# Patient Record
Sex: FEMALE | Race: WHITE | NOT HISPANIC OR LATINO | Employment: UNEMPLOYED | URBAN - METROPOLITAN AREA
[De-identification: names, ages, dates, MRNs, and addresses within clinical notes are randomized per-mention and may not be internally consistent; named-entity substitution may affect disease eponyms.]

---

## 2020-05-20 ENCOUNTER — TRANSCRIBE ORDERS (OUTPATIENT)
Dept: ADMINISTRATIVE | Facility: HOSPITAL | Age: 55
End: 2020-05-20

## 2020-05-20 DIAGNOSIS — T14.8XXA SPRAIN: Primary | ICD-10-CM

## 2020-05-31 ENCOUNTER — HOSPITAL ENCOUNTER (OUTPATIENT)
Dept: MRI IMAGING | Facility: HOSPITAL | Age: 55
Discharge: HOME | End: 2020-05-31
Payer: COMMERCIAL

## 2020-05-31 DIAGNOSIS — T14.8XXA SPRAIN: ICD-10-CM

## 2020-05-31 PROCEDURE — 73721 MRI JNT OF LWR EXTRE W/O DYE: CPT

## 2023-02-20 ENCOUNTER — CONSULT (OUTPATIENT)
Dept: NEUROSURGERY | Facility: CLINIC | Age: 58
End: 2023-02-20

## 2023-02-20 VITALS
HEART RATE: 117 BPM | HEIGHT: 66 IN | WEIGHT: 174 LBS | DIASTOLIC BLOOD PRESSURE: 86 MMHG | BODY MASS INDEX: 27.97 KG/M2 | TEMPERATURE: 98 F | SYSTOLIC BLOOD PRESSURE: 128 MMHG | RESPIRATION RATE: 16 BRPM | OXYGEN SATURATION: 98 %

## 2023-02-20 DIAGNOSIS — M54.9 BACK PAIN, UNSPECIFIED BACK LOCATION, UNSPECIFIED BACK PAIN LATERALITY, UNSPECIFIED CHRONICITY: Primary | ICD-10-CM

## 2023-02-20 PROBLEM — M50.90 CERVICAL DISC DISEASE: Status: ACTIVE | Noted: 2023-02-20

## 2023-02-20 PROBLEM — M54.16 LUMBAR RADICULOPATHY: Status: ACTIVE | Noted: 2023-02-20

## 2023-02-20 RX ORDER — PANTOPRAZOLE SODIUM 40 MG/1
TABLET, DELAYED RELEASE ORAL
COMMUNITY
Start: 2022-11-29

## 2023-02-20 RX ORDER — METAXALONE 800 MG/1
TABLET ORAL
COMMUNITY
Start: 2022-12-29

## 2023-02-20 RX ORDER — LIDOCAINE 50 MG/G
PATCH TOPICAL
COMMUNITY
Start: 2022-11-27

## 2023-02-20 RX ORDER — ACETAMINOPHEN AND CODEINE PHOSPHATE 300; 30 MG/1; MG/1
TABLET ORAL
COMMUNITY
Start: 2022-12-29

## 2023-02-20 RX ORDER — PRAVASTATIN SODIUM 10 MG
TABLET ORAL
COMMUNITY
Start: 2023-01-23

## 2023-02-20 RX ORDER — MONTELUKAST SODIUM 10 MG/1
TABLET ORAL
COMMUNITY
Start: 2023-01-23

## 2023-02-20 RX ORDER — MOMETASONE FUROATE AND FORMOTEROL FUMARATE DIHYDRATE 200; 5 UG/1; UG/1
AEROSOL RESPIRATORY (INHALATION)
COMMUNITY
Start: 2022-12-30

## 2023-02-20 RX ORDER — ALBUTEROL SULFATE 2.5 MG/3ML
SOLUTION RESPIRATORY (INHALATION)
COMMUNITY
Start: 2017-01-20

## 2023-02-20 RX ORDER — LEVOCETIRIZINE DIHYDROCHLORIDE 5 MG/1
TABLET, FILM COATED ORAL
COMMUNITY

## 2023-02-20 RX ORDER — ALBUTEROL SULFATE 2.5 MG/3ML
SOLUTION RESPIRATORY (INHALATION)
COMMUNITY
Start: 2022-11-28

## 2023-02-20 RX ORDER — MELOXICAM 15 MG/1
TABLET ORAL
COMMUNITY
Start: 2022-11-27

## 2023-02-20 RX ORDER — PANCRELIPASE LIPASE, PANCRELIPASE AMYLASE, AND PANCRELIPASE PROTEASE 149900; 37000; 97300 [USP'U]/1; [USP'U]/1; [USP'U]/1
CAPSULE, DELAYED RELEASE ORAL
COMMUNITY
Start: 2022-11-29

## 2023-02-20 RX ORDER — PROGESTERONE 200 MG/1
CAPSULE ORAL
COMMUNITY
Start: 2022-12-29

## 2023-02-20 RX ORDER — EZETIMIBE 10 MG/1
TABLET ORAL
COMMUNITY
Start: 2021-12-19

## 2023-02-20 RX ORDER — TRAMADOL HYDROCHLORIDE 50 MG/1
TABLET ORAL
COMMUNITY
Start: 2022-11-28

## 2023-02-20 RX ORDER — TELMISARTAN AND HYDROCHLORTHIAZIDE 80; 25 MG/1; MG/1
TABLET ORAL
COMMUNITY
Start: 2022-12-17

## 2023-02-20 RX ORDER — ESTRADIOL 1 MG/1
TABLET ORAL
COMMUNITY
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
## 2023-02-20 NOTE — ASSESSMENT & PLAN NOTE
Presents for evaluation of lumbar radiculopathy  · Describes significant bilateral posterior leg pain to ankle coming from back x 1 month  · PCP ordered MRI of lumbar spine however insurance would not approve this  · C/o bilateral LE weakness and pain  · Completed course of steroids per PCP with improvement in symptoms  · On exam, with 4/5 iliopsoas weakness to left  Otherwise intact  No BBI  Plan:  · Recommend MRI of lumbar spine be completed as she is experiencing focal weakness that is new  · Additionally, concern for symptoms of neurogenic claudication vs bilateral S1 radiculopathy  · Will likely require trial of conservative management pending MRI results  · Follow up once imaging has been completed
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
## 2023-02-26 ENCOUNTER — HOSPITAL ENCOUNTER (OUTPATIENT)
Dept: MRI IMAGING | Facility: HOSPITAL | Age: 58
Discharge: HOME | End: 2023-02-26

## 2023-02-26 DIAGNOSIS — M48.062 SPINAL STENOSIS, LUMBAR REGION WITH NEUROGENIC CLAUDICATION: ICD-10-CM

## 2023-11-10 ENCOUNTER — OFFICE VISIT (OUTPATIENT)
Dept: GASTROENTEROLOGY | Facility: AMBULARY SURGERY CENTER | Age: 58
End: 2023-11-10
Payer: COMMERCIAL

## 2023-11-10 VITALS
HEART RATE: 101 BPM | OXYGEN SATURATION: 98 % | BODY MASS INDEX: 29.47 KG/M2 | WEIGHT: 183.4 LBS | SYSTOLIC BLOOD PRESSURE: 130 MMHG | HEIGHT: 66 IN | DIASTOLIC BLOOD PRESSURE: 82 MMHG

## 2023-11-10 DIAGNOSIS — Z90.49 HISTORY OF CHOLECYSTECTOMY: ICD-10-CM

## 2023-11-10 DIAGNOSIS — R10.13 DYSPEPSIA: ICD-10-CM

## 2023-11-10 DIAGNOSIS — K86.81 EXOCRINE PANCREATIC INSUFFICIENCY: Primary | ICD-10-CM

## 2023-11-10 DIAGNOSIS — R13.10 DYSPHAGIA, UNSPECIFIED TYPE: ICD-10-CM

## 2023-11-10 PROCEDURE — 99204 OFFICE O/P NEW MOD 45 MIN: CPT | Performed by: INTERNAL MEDICINE

## 2023-11-10 RX ORDER — SUCRALFATE ORAL 1 G/10ML
1 SUSPENSION ORAL 2 TIMES DAILY
Qty: 600 ML | Refills: 0 | Status: SHIPPED | OUTPATIENT
Start: 2023-11-10 | End: 2023-12-10

## 2023-11-10 RX ORDER — BUDESONIDE 0.5 MG/2ML
INHALANT ORAL
COMMUNITY
Start: 2023-08-25

## 2023-11-10 NOTE — PROGRESS NOTES
Consultation - [address]  [facility]  Merritt Funes 62 y.o. female MRN: [id number]  Unit/Bed#:  Encounter: [id number]        Consults    ASSESSMENT/PLAN:     1.  EPI: Unclear etiology, pancreas appeared grossly unremarkable based on endoscopic ultrasound report from [facility] performed in August 2022. Denies any history of pancreatitis. Reports pancreatic cancer in her grandmother. She is not a smoker. No unintentional weight loss. She was recommended repeat MRI in 1 year interval.  -Currently on  pancreatic enzyme. -Recommend checking stool elastase.  -Recommend MRI/MRCP in summer to assess pancreatic parenchyma.  -Check celiac serologies however patient tells me that this was checked in the past.  -Patient is status post cholecystectomy, suspect diarrhea may partly be due to bile acids as well however patient is hesitant about starting on a bile acid binder, reports that she was previously on Questran which resulted in constipation and worsened her abdominal pain. She does not want to trial bile acid binders at this time. 2.  Dysphagia:  -EGD performed last year was notable for gastritis with biopsies negative for H. pylori. She is currently on Protonix 40 mg on daily basis.  -Underwent barium swallow which was notable for anterior cervical esophageal web and prominent cricopharyngeal bar along with small sliding hiatal hernia.  -We will plan for EGD with possible dilation given patient's reports of dysphagia with pills etc.  -We will continue pantoprazole 40 mg twice daily as patient reports that she has previously failed omeprazole and reports that Nexium was not covered. 3.  GERD:  -Symptoms controlled with pantoprazole 40 mg twice daily. 3.  Colon cancer screening: Underwent colonoscopy in June 2022, unclear what the recommended interval for next colonoscopy was, there was fecal material in right-sided the colon based on the colonoscopy report.   Patient reports having had colonoscopies in the past but does not recall having had polyps. At this time we will hold off on repeating procedure and will obtain the records from  GI.    ______________________________________________________________________    Reason for Consult / Principal Problem: [unfilled]    HPI: Tomas Springer is a 62y.o. year old female with history of EPI, GERD, presents to evaluation of dysphagia symptoms. Patient reports that this is new. She reports that she has had the symptoms for almost 6 months. She reports she is having difficulty swallowing tablets. She reports that they feel as though they are getting stuck. She denies any hematemesis, coffee emesis or unintentional weight loss. Patient also reports chronic symptoms of diarrhea, reports that this has been going on for several years, reports that has been worked extensively at [employer] and was diagnosed with exocrine pancreatic insufficiency. Denies any history of pancreatitis. She does report a family history of pancreatic cancer in her grandmother. She also reports that she has had multiple abdominal surgeries and this has resulted in adhesions. She reports that she was prescribed Questran in the past with concern for bile acid diarrhea however this resulted in decreased bowel movement which worsened her abdominal pain. Patient is hesitant about starting a bile acid binder at this time. She reports that she has been on pantoprazole 40 mg twice daily for some time now, reports that omeprazole has been ineffective. She reports that she  had been on Nexium previously however this was no longer covered with her insurance. Patient has previously been seen at  GI and has undergone endoscopic ultrasound in August 2022. She had also undergone an EGD at the time which was notable for antral and body erythema. Squamocolumnar junction was within normal range. EUS was grossly unremarkable of the pancreas, no biliary or pancreatic disease was seen. She was recommended to continue the pancreatic enzyme replacement treatment. He was also recommended an MRI at 1 year interval.  Biopsies from the EGD were negative for H. pylori infection. Patient underwent colonoscopy in June 2022 which was normal exam with stool seen in the cecum, biopsies were also obtained to assess for microscopic colitis. Pathology was negative for microscopic colitis. It is unclear what the recommendations for follow-up for. Review of Systems: The remainder of the review of systems was negative except for the pertinent positives noted in HPI.      Historical Information   Past Medical History:   Diagnosis Date    Fatty liver 2022    GERD (gastroesophageal reflux disease) 2001    Hyperlipidemia 2005    Hypertension 2005    Lactose intolerance 1999     Past Surgical History:   Procedure Laterality Date    ABDOMINAL SURGERY  2000, 2001, 2002    APPENDECTOMY  2001    CHOLECYSTECTOMY  2002    COLONOSCOPY  2002, 2022    ENDOSCOPIC ULTRASOUND (UPPER)  2022    HYSTERECTOMY  2000    UPPER GASTROINTESTINAL ENDOSCOPY  2002, 2022     Social History   Social History     Substance and Sexual Activity   Alcohol Use Never     Social History     Substance and Sexual Activity   Drug Use Never     Social History     Tobacco Use   Smoking Status Never   Smokeless Tobacco Never     Family History   Problem Relation Age of Onset    Arthritis Mother     Hyperlipidemia Mother     Hypertension Mother     Stroke Mother     Diabetes Father     Heart disease Father         CHF    Hyperlipidemia Father     Hypertension Father     Kidney disease Father         Kidney Failure    Cancer Maternal Grandmother         Pancreatic cancer    Asthma Paternal Grandmother     Breast cancer Maternal Aunt     Breast cancer Cousin     Celiac disease Maternal Aunt     Hyperlipidemia Sister     Hypertension Sister        Meds/Allergies     (Not in a hospital admission)    No current facility-administered medications for this visit. Allergies   Allergen Reactions    Penicillin G Anaphylaxis    Ciprofloxacin Hives    Clarithromycin GI Intolerance and Hives    Monascus Purpureus Went Yeast Nausea Only    Oxycodone-Acetaminophen GI Intolerance and Vomiting    Rosuvastatin Myalgia    Sulfamethoxazole-Trimethoprim Hives       Objective     Blood pressure 130/82, pulse 101, height 5' 6" (1.676 m), weight 83.2 kg (183 lb 6.4 oz), SpO2 98 %. [unfilled]    PHYSICAL EXAM     GEN: well nourished, well developed, no acute distress  HEENT: anicteric, MMM, no cervical or supraclavicular lymphadenopathy  CV: RRR, no m/r/g  CHEST: CTA b/l, no WRR  ABD: +BS, soft, NT/ND, no hepatosplenomegaly  EXT: no c/c/e  SKIN: no rashes,  NEURO: aaox3    Lab Results:   No visits with results within 1 Day(s) from this visit. Latest known visit with results is:   No results found for any previous visit. Imaging Studies: I have personally reviewed pertinent films in PACS                Answers submitted by the patient for this visit:  Abdominal Pain Questionnaire (Submitted on 11/5/2023)  Chief Complaint: Abdominal pain  Chronicity: new  Onset: more than 1 month ago  Onset quality: sudden  Frequency: every several days  Progression since onset: gradually worsening  Pain location: epigastric region  Pain - numeric: 8/10  Pain quality: cramping, sharp  Radiates to: does not radiate  anorexia: No  arthralgias: No  belching: No  constipation: No  diarrhea: No  dysuria: No  fever: No  flatus: No  frequency: No  headaches: No  hematochezia: No  hematuria: No  melena: No  myalgias: No  nausea:  No  weight loss: No  vomiting: No  Aggravated by: nothing  Relieved by: bowel movements  Diagnostic workup: GI consult, lower endoscopy, upper endoscopy

## 2023-11-10 NOTE — PATIENT INSTRUCTIONS
Scheduled date of EGD(as of today):  12/20/23  Physician performing EGD:  Dr Quang Green   Location of EGD: [facility]   Instructions reviewed with patient by: annmarie MENON   Clearances: N/a

## 2023-11-24 LAB
25(OH)D3 SERPL-MCNC: 74 NG/ML (ref 30–100)
A-TOCOPHEROL VIT E SERPL-MCNC: 17.5 MG/L (ref 5.7–19.9)
BETA+GAMMA TOCOPHEROL SERPL-MCNC: <1 MG/L
IGA SERPL-MCNC: 310 MG/DL (ref 47–310)
PHYTONADIONE SERPL-MCNC: 299 PG/ML (ref 130–1500)
TSH SERPL-ACNC: 2.5 MIU/L (ref 0.4–4.5)
TTG IGA SER-ACNC: <1 U/ML
VIT A SERPL-MCNC: 74 MCG/DL (ref 38–98)

## 2023-12-05 ENCOUNTER — ANESTHESIA (OUTPATIENT)
Dept: ANESTHESIOLOGY | Facility: HOSPITAL | Age: 58
End: 2023-12-05

## 2023-12-05 ENCOUNTER — ANESTHESIA EVENT (OUTPATIENT)
Dept: ANESTHESIOLOGY | Facility: HOSPITAL | Age: 58
End: 2023-12-05

## 2023-12-19 RX ORDER — SODIUM CHLORIDE, SODIUM LACTATE, POTASSIUM CHLORIDE, CALCIUM CHLORIDE 600; 310; 30; 20 MG/100ML; MG/100ML; MG/100ML; MG/100ML
75 INJECTION, SOLUTION INTRAVENOUS CONTINUOUS
Status: CANCELLED | OUTPATIENT
Start: 2023-12-19

## 2023-12-20 ENCOUNTER — HOSPITAL ENCOUNTER (OUTPATIENT)
Dept: GASTROENTEROLOGY | Facility: AMBULARY SURGERY CENTER | Age: 58
Setting detail: OUTPATIENT SURGERY
Discharge: HOME | End: 2023-12-20
Attending: INTERNAL MEDICINE
Payer: COMMERCIAL

## 2023-12-20 ENCOUNTER — ANESTHESIA (OUTPATIENT)
Dept: GASTROENTEROLOGY | Facility: AMBULARY SURGERY CENTER | Age: 58
End: 2023-12-20

## 2023-12-20 ENCOUNTER — ANESTHESIA EVENT (OUTPATIENT)
Dept: GASTROENTEROLOGY | Facility: AMBULARY SURGERY CENTER | Age: 58
End: 2023-12-20

## 2023-12-20 VITALS
SYSTOLIC BLOOD PRESSURE: 132 MMHG | TEMPERATURE: 97.9 F | HEART RATE: 93 BPM | RESPIRATION RATE: 18 BRPM | DIASTOLIC BLOOD PRESSURE: 67 MMHG | OXYGEN SATURATION: 99 %

## 2023-12-20 DIAGNOSIS — R10.13 DYSPEPSIA: ICD-10-CM

## 2023-12-20 PROBLEM — E78.5 HYPERLIPIDEMIA: Status: ACTIVE | Noted: 2023-12-20

## 2023-12-20 PROBLEM — I74.9 ARTERIAL THROMBOSIS (HCC): Status: ACTIVE | Noted: 2023-12-20

## 2023-12-20 PROBLEM — J45.909 MODERATE ASTHMA: Status: ACTIVE | Noted: 2023-12-20

## 2023-12-20 PROBLEM — I10 HTN (HYPERTENSION): Status: ACTIVE | Noted: 2023-12-20

## 2023-12-20 PROBLEM — D64.9 ANEMIA: Status: ACTIVE | Noted: 2023-12-20

## 2023-12-20 PROBLEM — K21.9 GASTROESOPHAGEAL REFLUX DISEASE: Status: ACTIVE | Noted: 2023-12-20

## 2023-12-20 PROCEDURE — 88305 TISSUE EXAM BY PATHOLOGIST: CPT | Performed by: PATHOLOGY

## 2023-12-20 PROCEDURE — 43248 EGD GUIDE WIRE INSERTION: CPT | Performed by: INTERNAL MEDICINE

## 2023-12-20 PROCEDURE — 43239 EGD BIOPSY SINGLE/MULTIPLE: CPT | Performed by: INTERNAL MEDICINE

## 2023-12-20 PROCEDURE — 43251 EGD REMOVE LESION SNARE: CPT | Performed by: INTERNAL MEDICINE

## 2023-12-20 PROCEDURE — C1769 GUIDE WIRE: HCPCS

## 2023-12-20 RX ORDER — PROPOFOL 10 MG/ML
INJECTION, EMULSION INTRAVENOUS AS NEEDED
Status: DISCONTINUED | OUTPATIENT
Start: 2023-12-20 | End: 2023-12-20

## 2023-12-20 RX ORDER — SODIUM CHLORIDE, SODIUM LACTATE, POTASSIUM CHLORIDE, CALCIUM CHLORIDE 600; 310; 30; 20 MG/100ML; MG/100ML; MG/100ML; MG/100ML
75 INJECTION, SOLUTION INTRAVENOUS CONTINUOUS
Status: DISCONTINUED | OUTPATIENT
Start: 2023-12-20 | End: 2023-12-24 | Stop reason: HOSPADM

## 2023-12-20 RX ORDER — LIDOCAINE HYDROCHLORIDE 10 MG/ML
INJECTION, SOLUTION EPIDURAL; INFILTRATION; INTRACAUDAL; PERINEURAL AS NEEDED
Status: DISCONTINUED | OUTPATIENT
Start: 2023-12-20 | End: 2023-12-20

## 2023-12-20 RX ORDER — PANCRELIPASE LIPASE, PANCRELIPASE PROTEASE, PANCRELIPASE AMYLASE 40000; 126000; 168000 [USP'U]/1; [USP'U]/1; [USP'U]/1
40000 CAPSULE, DELAYED RELEASE ORAL DAILY
COMMUNITY

## 2023-12-20 RX ORDER — ALBUTEROL SULFATE 2.5 MG/3ML
2.5 SOLUTION RESPIRATORY (INHALATION) ONCE
Status: COMPLETED | OUTPATIENT
Start: 2023-12-20 | End: 2023-12-20

## 2023-12-20 RX ADMIN — ALBUTEROL SULFATE 2.5 MG: 2.5 SOLUTION RESPIRATORY (INHALATION) at 10:30

## 2023-12-20 RX ADMIN — LIDOCAINE HYDROCHLORIDE 50 MG: 10 INJECTION, SOLUTION EPIDURAL; INFILTRATION; INTRACAUDAL; PERINEURAL at 11:12

## 2023-12-20 RX ADMIN — PROPOFOL 50 MG: 10 INJECTION, EMULSION INTRAVENOUS at 11:14

## 2023-12-20 RX ADMIN — SODIUM CHLORIDE, SODIUM LACTATE, POTASSIUM CHLORIDE, AND CALCIUM CHLORIDE 75 ML/HR: .6; .31; .03; .02 INJECTION, SOLUTION INTRAVENOUS at 10:03

## 2023-12-20 RX ADMIN — PROPOFOL 200 MG: 10 INJECTION, EMULSION INTRAVENOUS at 11:12

## 2023-12-20 RX ADMIN — PROPOFOL 50 MG: 10 INJECTION, EMULSION INTRAVENOUS at 11:18

## 2023-12-20 RX ADMIN — PROPOFOL 50 MG: 10 INJECTION, EMULSION INTRAVENOUS at 11:13

## 2023-12-20 NOTE — H&P
History and Physical -  [facility]  Dayana Boyd 58 y.o. female MRN: [id number]    HPI: Dayana Boyd is a 58 y.o. year old female who presents for dysphagia.       Review of Systems    Historical Information   Past Medical History:   Diagnosis Date    Fatty liver 2022    GERD (gastroesophageal reflux disease) 2001    Hyperlipidemia 2005    Hypertension 2005    Lactose intolerance 1999     Past Surgical History:   Procedure Laterality Date    ABDOMINAL SURGERY  2000, 2001, 2002    APPENDECTOMY  2001    CHOLECYSTECTOMY  2002    COLONOSCOPY  2002, 2022    ENDOSCOPIC ULTRASOUND (UPPER)  2022    HYSTERECTOMY  2000    UPPER GASTROINTESTINAL ENDOSCOPY  2002, 2022     Social History   Social History     Substance and Sexual Activity   Alcohol Use Never     Social History     Substance and Sexual Activity   Drug Use Never     Social History     Tobacco Use   Smoking Status Never   Smokeless Tobacco Never     Family History   Problem Relation Age of Onset    Arthritis Mother     Hyperlipidemia Mother     Hypertension Mother     Stroke Mother     Diabetes Father     Heart disease Father         CHF    Hyperlipidemia Father     Hypertension Father     Kidney disease Father         Kidney Failure    Cancer Maternal Grandmother         Pancreatic cancer    Asthma Paternal Grandmother     Breast cancer Maternal Aunt     Breast cancer Cousin     Celiac disease Maternal Aunt     Hyperlipidemia Sister     Hypertension Sister        Meds/Allergies     (Not in a hospital admission)      Allergies   Allergen Reactions    Penicillin G Anaphylaxis    Ciprofloxacin Hives    Clarithromycin GI Intolerance and Hives    Monascus Purpureus Went Yeast Nausea Only    Oxycodone-Acetaminophen GI Intolerance and Vomiting    Rosuvastatin Myalgia    Sulfamethoxazole-Trimethoprim Hives       Objective     /79   Pulse 104   Temp 97.9 °F (36.6 °C) (Temporal)   Resp 18   SpO2 99%       PHYSICAL EXAM    Gen: NAD  CV:  RRR  CHEST: Clear  ABD: soft, NT/ND  EXT: no edema  Neuro: AAO      ASSESSMENT/PLAN:  This is a 58 y.o. year old female here for dysphagia evaluation.     PLAN:   Procedure: EGD with possible dilation.

## 2023-12-20 NOTE — ANESTHESIA PREPROCEDURE EVALUATION
Procedure:  EGD    Relevant Problems   CARDIO   (+) HTN (hypertension)   (+) History of Arterial thrombosis (HCC) (RLE)   (+) Hyperlipidemia      GI/HEPATIC   (+) Dysphagia   (+) Exocrine pancreatic insufficiency   (+) Gastroesophageal reflux disease      HEMATOLOGY   (+) Anemia      PULMONARY   (+) Moderate asthma        Physical Exam    Airway    Mallampati score: II  TM Distance: >3 FB  Neck ROM: full     Dental       Cardiovascular  Rhythm: regular, Rate: normal    Pulmonary      Other Findings  post-pubertal.      Anesthesia Plan  ASA Score- 3     Anesthesia Type- IV sedation with anesthesia with ASA Monitors.         Additional Monitors:     Airway Plan:            Plan Factors-    Chart reviewed.        Patient is not a current smoker.              Induction- intravenous.    Postoperative Plan-     Informed Consent- Anesthetic plan and risks discussed with patient.  I personally reviewed this patient with the CRNA. Discussed and agreed on the Anesthesia Plan with the CRNA..

## 2023-12-20 NOTE — ANESTHESIA POSTPROCEDURE EVALUATION
Post-Op Assessment Note    CV Status:  Stable  Pain Score: 0    Pain management: adequate       Mental Status:  Awake   Hydration Status:  Stable   PONV Controlled:  None   Airway Patency:  Patent     Post Op Vitals Reviewed: Yes      Staff: CRNA   Comments: spontaneously breathing, ventilating well, vss, simple mask to O2, fully endorsed to recovery w/o AC              /56 (12/20/23 1131)    Temp      Pulse 103 (12/20/23 1131)   Resp 16 (12/20/23 1131)    SpO2 97 % (12/20/23 1131)

## 2023-12-26 PROCEDURE — 88305 TISSUE EXAM BY PATHOLOGIST: CPT | Performed by: PATHOLOGY

## 2023-12-29 DIAGNOSIS — R10.13 DYSPEPSIA: ICD-10-CM

## 2024-01-02 ENCOUNTER — APPOINTMENT (OUTPATIENT)
Dept: LAB | Facility: AMBULARY SURGERY CENTER | Age: 59
End: 2024-01-02
Payer: COMMERCIAL

## 2024-01-02 ENCOUNTER — TELEPHONE (OUTPATIENT)
Dept: GASTROENTEROLOGY | Facility: AMBULARY SURGERY CENTER | Age: 59
End: 2024-01-02

## 2024-01-02 DIAGNOSIS — K86.81 EXOCRINE PANCREATIC INSUFFICIENCY: Primary | ICD-10-CM

## 2024-01-02 DIAGNOSIS — M35.3 POLYMYALGIA RHEUMATICA (HCC): ICD-10-CM

## 2024-01-02 LAB
ANA SER QL IA: NEGATIVE
CRP SERPL HS-MCNC: 4.69 MG/L
ERYTHROCYTE [SEDIMENTATION RATE] IN BLOOD: 49 MM/HOUR (ref 0–29)

## 2024-01-02 PROCEDURE — 86038 ANTINUCLEAR ANTIBODIES: CPT

## 2024-01-02 PROCEDURE — 86141 C-REACTIVE PROTEIN HS: CPT

## 2024-01-02 PROCEDURE — 36415 COLL VENOUS BLD VENIPUNCTURE: CPT

## 2024-01-02 PROCEDURE — 85652 RBC SED RATE AUTOMATED: CPT

## 2024-01-02 PROCEDURE — 86430 RHEUMATOID FACTOR TEST QUAL: CPT

## 2024-01-02 RX ORDER — SUCRALFATE ORAL 1 G/10ML
1 SUSPENSION ORAL 2 TIMES DAILY
Qty: 600 ML | Refills: 0 | Status: SHIPPED | OUTPATIENT
Start: 2024-01-02 | End: 2024-02-01

## 2024-01-02 NOTE — TELEPHONE ENCOUNTER
Order placed. Please advise patient she can have this done while on the pancreatic enzymes as the pills do not affect the results of this test. thank you!

## 2024-01-03 LAB — RHEUMATOID FACT SER QL LA: NEGATIVE

## 2024-01-18 NOTE — PROGRESS NOTES
Rheumatology Outpatient Consult Note  1/19/2024       Houston Osuna DO  [address] [place] [address]    Reason for referral: abnormal labs    Assessment: 58 y.o. female referred for the above.    Symptoms don't sound super inflammatory (no gelling, no robust response to NSAIDs); however, given the onset of symptoms soon after COVID infection (have seen many cases of new rheum disease manifesting after COVID infection) and the tenderness to palpation she has of some entheseal sites, want to evaluate a little bit further. If she were to have an inflammatory arthritis I would lean more towards a peripheral SpA particularly PsA with enthesitis. That being said much of her symptomatology does sound more degenerative    I do think she also has a component of FMS contributing, particularly as evidenced by her extreme tenderness to large muscle groups on exam. If there is an underlying inflammatory arthritis this is probably exacerbating it. We discussed it briefly today but will go more in depth at next visit especially if further ballard not convincing of an active inflammatory arthritis    Not sure what to make of her intermittent fevers, she doesn't have any convincing symptoms of an overt chronic infection or a CTD or PFS that would explain this. Possibly post-COVID sequelae?    Encounter Diagnosis     ICD-10-CM    1. Arthritis  M19.90 XR hand 3+ vw left     XR hand 3+ vw right     XR wrist 2 vw left     XR wrist 2 vw right     XR foot 3+ vw right     XR foot 3+ vw left     Cyclic citrul peptide antibody, IgG     Comprehensive metabolic panel     CBC and differential     US MSK limited     CANCELED: US MSK limited      2. Primary generalized (osteo)arthritis  M15.0       3. Fibromyalgia  M79.7       4. Fever in other diseases  R50.81           Plan:  -As above  -RTC 6 weeks to discuss results, further follow up to be decided based on that visit    Neil Ace DO, CCD    Neil Ace DO, CCD  [facility]  Rheumatology     History: Dayana Boyd is a(n) 58 y.o. female who is referred for the above. PMH of pancreatic insufficiency on replacement, GERD complicated by dysphagia sp dilation, hepatic steatosis.    I'm assuming the abnormal labs in question are the ESR and CRP. I would not consider a CRP of 4.69 elevated. ESR of 49 is only mildly elevated; ULN for her age/sex would be about 35.    Hands, feet, ankles knees pain; PCP ordered above tests    Pain has been going on since beginning of November (other than knees that have been going on off and on for years). All started very suddenly. Did have COVID in October.    Not worse in mornings. Does feel stiff in the joints, not particular time of day. Normally is able to pull her rings off but now can't. Seems consistently swollen but does get worse at times. Does notice swelling in knees ankles and feet as well    Worst pain in R hand is 1st MCP and wrist    Since around Dec, has been getting chills and checks her T and will be between 100.4-100.9    Did have a rash on the back of her right wrist at one point that did not look like her normal eczema, was there for weeks, went away on its own. Went away around last week. Was raised little red bumps. No preceding trauma.    Does have an aphthous ulcer on the roof of her mouth, happens once in a while    Has chronic L arm weakness from herniated discs, but does feel like R hand is getting weaker    Itchy eyes a lot, very dry. Uses eye drops at night. Has been getting much worse since Dec. Has chronic dry mouth, not new or worsened, no dental issues. No vaginal dryness.    Some SOB from asthma but nothing new or worsened    Numbness in L hand constant, sometimes tingling in hands too    Daily meloxicam isn't helping much with her pain/stiffness    Denies:  Nasal/genital ulcers  Uveitis  Dactylitis  Dysphagia/odynophagia  CP  Pleurisy  Stomach pain new  Constipation/bloating  Hematochezia  Gross hematuria  Numbness/tingling  other than above  Raynaud's  Joint issues other than noted above    Mother had arthritis, family believes she had RA but didn't have a formal diagnosis. No other known family history AI disease.    Past Medical History:   Diagnosis Date    Fatty liver 2022    GERD (gastroesophageal reflux disease) 2001    Hyperlipidemia 2005    Hypertension 2005    Lactose intolerance 1999       Past Surgical History:   Procedure Laterality Date    ABDOMINAL SURGERY  2000, 2001, 2002    APPENDECTOMY  2001    CHOLECYSTECTOMY  2002    COLONOSCOPY  2002, 2022    ENDOSCOPIC ULTRASOUND (UPPER)  2022    HYSTERECTOMY  2000    UPPER GASTROINTESTINAL ENDOSCOPY  2002, 2022       Outpatient Medications Marked as Taking for the 1/19/24 encounter (Office Visit) with Neil Ace,    Medication    acetaminophen-codeine (TYLENOL #3) 300-30 mg per tablet    albuterol (2.5 mg/3 mL) 0.083 % nebulizer solution    budesonide (PULMICORT) 0.5 mg/2 mL nebulizer solution    Dulera 200-5 MCG/ACT inhaler    estradiol (ESTRACE) 1 mg tablet    ezetimibe (ZETIA) 10 mg tablet    levocetirizine (XYZAL) 5 MG tablet    lidocaine (LIDODERM) 5 %    meloxicam (MOBIC) 15 mg tablet    metaxalone (SKELAXIN) 800 mg tablet    montelukast (SINGULAIR) 10 mg tablet    Pancrelipase, Lip-Prot-Amyl, (Zenpep) 53688-009023 units CPEP    pantoprazole (PROTONIX) 40 mg tablet    pravastatin (PRAVACHOL) 10 mg tablet    Progesterone 200 MG CAPS    sucralfate (CARAFATE) 1 g/10 mL suspension    telmisartan-hydrochlorothiazide (MICARDIS HCT) 80-25 MG per tablet    traMADol (ULTRAM) 50 mg tablet       Allergies as of 01/19/2024 - Reviewed 01/19/2024   Allergen Reaction Noted    Penicillin g Anaphylaxis 12/30/2021    Ciprofloxacin Hives 12/30/2021    Clarithromycin GI Intolerance and Hives 12/30/2021    Monascus purpureus went yeast Nausea Only 12/30/2021    Oxycodone-acetaminophen GI Intolerance and Vomiting 12/30/2021    Rosuvastatin Myalgia 12/30/2021     "Sulfamethoxazole-trimethoprim Hives 12/30/2021       Family History   Problem Relation Age of Onset    Arthritis Mother     Hyperlipidemia Mother     Hypertension Mother     Stroke Mother     Diabetes Father     Heart disease Father         CHF    Hyperlipidemia Father     Hypertension Father     Kidney disease Father         Kidney Failure    Cancer Maternal Grandmother         Pancreatic cancer    Asthma Paternal Grandmother     Breast cancer Maternal Aunt     Breast cancer Cousin     Celiac disease Maternal Aunt     Hyperlipidemia Sister     Hypertension Sister        Social History:  Social History     Tobacco Use    Smoking status: Never    Smokeless tobacco: Never   Vaping Use    Vaping status: Never Used   Substance Use Topics    Alcohol use: Never    Drug use: Never       Review of Systems: Pertinent findings documented in HPI    ___________________________________    Physical Exam:    /84   Ht 5' 6\" (1.676 m)   Wt 81.6 kg (180 lb)   BMI 29.05 kg/m²     General: Well appearing, in no distress.   Eyes: EOMI  HENT: No oral ulcers. MMM.   Heart: Regular rate and rhythm, no murmurs.  Lungs: Lungs clear bilaterally without wheezes or crackles.   Extremities: Warm, well perfused, no edema.   Neuro: Alert and oriented.  Skin: No rashes. No nail pits  Musculoskeletal exam: tenderness to palpation bilateral shoulders, R 1st CMC and MCP, 2nd PIP, bilateral knees, ankles, MTPs wo synovitis. Tenderness to palpation C-spine wo synovitis, no paraspinal tenderness to palpation. Significant tenderness to palpation bilateral forearms, calves.    Muscle strength   Right   Left    Shoulder abduction 5/5  Shoulder abduction 4/5   Shoulder adduction 5/5  Shoulder adduction 4/5   Shoulder internal rotation 5/5  Shoulder internal rotation 4/5   Shoulder external rotation 5/5  Shoulder external rotation 4/5   Elbow flexion 5/5  Elbow flexion 4/5   Elbow extension 5/5  Elbow extension 4/5   Wrist flexion 5/5  Wrist flexion " 4/5   Wrist extension 5/5  Wrist extension 4/5   Hip flexion 5/5  Hip flexion 5/5   Hip extension 5/5  Hip extension 5/5   Hip abduction 5/5  Hip abduction 5/5   Hip adduction 5/5  Hip adduction 5/5   Knee flexion 5/5  Knee flexion 5/5   Knee extension 5/5  Knee extension 5/5   Dorsiflexion 5/5  Dorsiflexion 5/5   Plantarflexion 5/5  Plantarflexion 5/5          Neck   Flexion 5/5   Extension 5/5   R sidebending 5/5   L sidebending 5/5     ____________________________    Lab Result Review: relevant labs reviewed   Latest Reference Range & Units 01/02/24 09:43   HIGH SENSITIVITY C-REACTIVE PROTEIN <3.00 mg/L 4.69 (H)   Sed Rate 0 - 29 mm/hour 49 (H)   VINCENT Negative  Negative   RHEUMATOID FACTOR Negative  Negative   (H): Data is abnormally high    Imaging Result Review: relevant images reviewed

## 2024-01-19 ENCOUNTER — OFFICE VISIT (OUTPATIENT)
Dept: RHEUMATOLOGY | Facility: CLINIC | Age: 59
End: 2024-01-19
Payer: COMMERCIAL

## 2024-01-19 VITALS
DIASTOLIC BLOOD PRESSURE: 84 MMHG | SYSTOLIC BLOOD PRESSURE: 130 MMHG | HEIGHT: 66 IN | BODY MASS INDEX: 28.93 KG/M2 | WEIGHT: 180 LBS

## 2024-01-19 DIAGNOSIS — M15.0 PRIMARY GENERALIZED (OSTEO)ARTHRITIS: ICD-10-CM

## 2024-01-19 DIAGNOSIS — M79.7 FIBROMYALGIA: ICD-10-CM

## 2024-01-19 DIAGNOSIS — M19.90 ARTHRITIS: Primary | ICD-10-CM

## 2024-01-19 DIAGNOSIS — R50.81 FEVER IN OTHER DISEASES: ICD-10-CM

## 2024-01-19 PROCEDURE — 99204 OFFICE O/P NEW MOD 45 MIN: CPT | Performed by: STUDENT IN AN ORGANIZED HEALTH CARE EDUCATION/TRAINING PROGRAM

## 2024-02-08 ENCOUNTER — APPOINTMENT (OUTPATIENT)
Dept: LAB | Facility: MEDICAL CENTER | Age: 59
End: 2024-02-08
Payer: COMMERCIAL

## 2024-02-08 ENCOUNTER — APPOINTMENT (OUTPATIENT)
Dept: RADIOLOGY | Facility: MEDICAL CENTER | Age: 59
End: 2024-02-08
Payer: COMMERCIAL

## 2024-02-08 DIAGNOSIS — K86.81 EXOCRINE PANCREATIC INSUFFICIENCY: ICD-10-CM

## 2024-02-08 DIAGNOSIS — M19.90 ARTHRITIS: ICD-10-CM

## 2024-02-08 DIAGNOSIS — R10.13 DYSPEPSIA: ICD-10-CM

## 2024-02-08 LAB
25(OH)D3 SERPL-MCNC: 80.6 NG/ML (ref 30–100)
ALBUMIN SERPL BCP-MCNC: 4.6 G/DL (ref 3.5–5)
ALP SERPL-CCNC: 74 U/L (ref 34–104)
ALT SERPL W P-5'-P-CCNC: 17 U/L (ref 7–52)
ANION GAP SERPL CALCULATED.3IONS-SCNC: 12 MMOL/L
AST SERPL W P-5'-P-CCNC: 22 U/L (ref 13–39)
BASOPHILS # BLD AUTO: 0.06 THOUSANDS/ÂΜL (ref 0–0.1)
BASOPHILS NFR BLD AUTO: 1 % (ref 0–1)
BILIRUB SERPL-MCNC: 0.33 MG/DL (ref 0.2–1)
BUN SERPL-MCNC: 29 MG/DL (ref 5–25)
CALCIUM SERPL-MCNC: 9.4 MG/DL (ref 8.4–10.2)
CHLORIDE SERPL-SCNC: 99 MMOL/L (ref 96–108)
CO2 SERPL-SCNC: 26 MMOL/L (ref 21–32)
CREAT SERPL-MCNC: 0.99 MG/DL (ref 0.6–1.3)
EOSINOPHIL # BLD AUTO: 0.22 THOUSAND/ÂΜL (ref 0–0.61)
EOSINOPHIL NFR BLD AUTO: 3 % (ref 0–6)
ERYTHROCYTE [DISTWIDTH] IN BLOOD BY AUTOMATED COUNT: 15.7 % (ref 11.6–15.1)
GFR SERPL CREATININE-BSD FRML MDRD: 63 ML/MIN/1.73SQ M
GLUCOSE P FAST SERPL-MCNC: 92 MG/DL (ref 65–99)
HCT VFR BLD AUTO: 35.6 % (ref 34.8–46.1)
HGB BLD-MCNC: 11.4 G/DL (ref 11.5–15.4)
IMM GRANULOCYTES # BLD AUTO: 0.02 THOUSAND/UL (ref 0–0.2)
IMM GRANULOCYTES NFR BLD AUTO: 0 % (ref 0–2)
LYMPHOCYTES # BLD AUTO: 1.53 THOUSANDS/ÂΜL (ref 0.6–4.47)
LYMPHOCYTES NFR BLD AUTO: 23 % (ref 14–44)
MCH RBC QN AUTO: 27.3 PG (ref 26.8–34.3)
MCHC RBC AUTO-ENTMCNC: 32 G/DL (ref 31.4–37.4)
MCV RBC AUTO: 85 FL (ref 82–98)
MONOCYTES # BLD AUTO: 0.76 THOUSAND/ÂΜL (ref 0.17–1.22)
MONOCYTES NFR BLD AUTO: 12 % (ref 4–12)
NEUTROPHILS # BLD AUTO: 4.01 THOUSANDS/ÂΜL (ref 1.85–7.62)
NEUTS SEG NFR BLD AUTO: 61 % (ref 43–75)
NRBC BLD AUTO-RTO: 0 /100 WBCS
PLATELET # BLD AUTO: 444 THOUSANDS/UL (ref 149–390)
PMV BLD AUTO: 9.6 FL (ref 8.9–12.7)
POTASSIUM SERPL-SCNC: 3.7 MMOL/L (ref 3.5–5.3)
PROT SERPL-MCNC: 7.8 G/DL (ref 6.4–8.4)
RBC # BLD AUTO: 4.18 MILLION/UL (ref 3.81–5.12)
SODIUM SERPL-SCNC: 137 MMOL/L (ref 135–147)
TSH SERPL DL<=0.05 MIU/L-ACNC: 1.34 UIU/ML (ref 0.45–4.5)
WBC # BLD AUTO: 6.6 THOUSAND/UL (ref 4.31–10.16)

## 2024-02-08 PROCEDURE — 73630 X-RAY EXAM OF FOOT: CPT

## 2024-02-08 PROCEDURE — 36415 COLL VENOUS BLD VENIPUNCTURE: CPT

## 2024-02-08 PROCEDURE — 84597 ASSAY OF VITAMIN K: CPT

## 2024-02-08 PROCEDURE — 84446 ASSAY OF VITAMIN E: CPT

## 2024-02-08 PROCEDURE — 85025 COMPLETE CBC W/AUTO DIFF WBC: CPT

## 2024-02-08 PROCEDURE — 82784 ASSAY IGA/IGD/IGG/IGM EACH: CPT

## 2024-02-08 PROCEDURE — 86258 DGP ANTIBODY EACH IG CLASS: CPT

## 2024-02-08 PROCEDURE — 86364 TISS TRNSGLTMNASE EA IG CLAS: CPT

## 2024-02-08 PROCEDURE — 86231 EMA EACH IG CLASS: CPT

## 2024-02-08 PROCEDURE — 73110 X-RAY EXAM OF WRIST: CPT

## 2024-02-08 PROCEDURE — 82306 VITAMIN D 25 HYDROXY: CPT

## 2024-02-08 PROCEDURE — 73130 X-RAY EXAM OF HAND: CPT

## 2024-02-08 PROCEDURE — 84443 ASSAY THYROID STIM HORMONE: CPT

## 2024-02-08 PROCEDURE — 84590 ASSAY OF VITAMIN A: CPT

## 2024-02-08 PROCEDURE — 86200 CCP ANTIBODY: CPT

## 2024-02-08 PROCEDURE — 80053 COMPREHEN METABOLIC PANEL: CPT

## 2024-02-09 ENCOUNTER — APPOINTMENT (OUTPATIENT)
Dept: LAB | Facility: MEDICAL CENTER | Age: 59
End: 2024-02-09
Payer: COMMERCIAL

## 2024-02-09 LAB
ENDOMYSIUM IGA SER QL: NEGATIVE
GLIADIN PEPTIDE IGA SER-ACNC: 7 UNITS (ref 0–19)
GLIADIN PEPTIDE IGG SER-ACNC: 4 UNITS (ref 0–19)
IGA SERPL-MCNC: 348 MG/DL (ref 87–352)
TTG IGA SER-ACNC: <2 U/ML (ref 0–3)
TTG IGG SER-ACNC: <2 U/ML (ref 0–5)

## 2024-02-09 PROCEDURE — 82653 EL-1 FECAL QUANTITATIVE: CPT

## 2024-02-13 LAB — CCP AB SER IA-ACNC: 1.3

## 2024-02-14 ENCOUNTER — OFFICE VISIT (OUTPATIENT)
Dept: GASTROENTEROLOGY | Facility: AMBULARY SURGERY CENTER | Age: 59
End: 2024-02-14
Payer: COMMERCIAL

## 2024-02-14 VITALS
WEIGHT: 183 LBS | HEART RATE: 104 BPM | DIASTOLIC BLOOD PRESSURE: 86 MMHG | OXYGEN SATURATION: 97 % | BODY MASS INDEX: 29.41 KG/M2 | HEIGHT: 66 IN | SYSTOLIC BLOOD PRESSURE: 136 MMHG

## 2024-02-14 DIAGNOSIS — K86.81 EXOCRINE PANCREATIC INSUFFICIENCY: Primary | ICD-10-CM

## 2024-02-14 LAB — ELASTASE PANC STL-MCNT: 53 UG ELAST./G

## 2024-02-14 PROCEDURE — 99214 OFFICE O/P EST MOD 30 MIN: CPT | Performed by: PHYSICIAN ASSISTANT

## 2024-02-14 RX ORDER — DICYCLOMINE HYDROCHLORIDE 10 MG/1
10 CAPSULE ORAL 3 TIMES DAILY PRN
Qty: 90 CAPSULE | Refills: 1 | Status: SHIPPED | OUTPATIENT
Start: 2024-02-14

## 2024-02-14 RX ORDER — ALBUTEROL SULFATE 90 UG/1
AEROSOL, METERED RESPIRATORY (INHALATION)
COMMUNITY
Start: 2023-12-29

## 2024-02-14 RX ORDER — PANCRELIPASE LIPASE, PANCRELIPASE PROTEASE, PANCRELIPASE AMYLASE 252600; 60000; 189600 [USP'U]/1; [USP'U]/1; [USP'U]/1
180000 CAPSULE, DELAYED RELEASE ORAL
Qty: 270 CAPSULE | Refills: 5 | Status: SHIPPED | OUTPATIENT
Start: 2024-02-14

## 2024-02-14 NOTE — PROGRESS NOTES
Follow-up Note -  [facility]  Dayana Dumontct 1965 58 y.o. female         ASSESSMENT & PLAN:    #1.  Steatorrhea, diarrhea, frequent stools and abdominal cramping with depressed pancreatic elastase level, patient had EUS in 2022 showing grossly normal pancreas.  Patient likely has EPI but also likely component of IBS and bowel spasm contributing to her symptomatology as well    -Recommend patient follow-up for MRI/MRCP in the summer as previously discussed    -Will increase her dose of Zenpep to 60,000 units capsules 3 capsules 3 times daily with meals    -Advised patient also about low-fat diet, as excessive fat intake will likely overwhelm the capacity of pancreatic enzyme supplementation to aid in lipid digestion    -Will also give trial of dicyclomine 10 mg 3 times daily as needed for bowel spasm/diarrhea/stool urgency, prescription sent to her pharmacy    -Patient was also given information on FODMAP diet/avoiding gas producing foods, as intraluminal gas distention is likely contributing to abdominal cramping in this patient with known history of significant abdominal adhesions    -Follow-up in a few months, can consider repeating colonoscopy and/or evaluating small bowel if symptomatology persists despite these measures        Reason: Follow-up; pancreatic insufficiency, diarrhea and abdominal cramping    HPI: 58-year-old female with history of hypertension and hyperlipidemia, multiple abdominal surgeries in the early 2000's including diagnostic laparotomy for endometriosis, hysterectomy, oophorectomy, lysis of adhesions, appendectomy and cholecystectomy, presents for follow-up.  She was last seen in our office a few months ago in November 2023 with Dr. Gamble, at that time for follow-up of exocrine pancreatic insufficiency of unclear etiology, she had previously undergone EUS in 2022 which showed grossly normal-appearing pancreas, was recommended for MRCP in 1 year to follow-up (she will  be due for this in June 2024).      She was also having symptoms of dysphagia and so had EGD done in December 2023 with dilation of the cricopharynx and upper one third of the esophagus, patient says she has had some improvement in her swallowing issues since then although having some gradual return of swallowing discomforts as of late, she is currently taking Protonix daily.      Currently being worked up for arthralgias and possible rheumatologic issue.  She says that lately she has been having worsening abdominal cramping, she says she moves her bowels about 5-7 times a day and this has been the case for a long time, she endorses steatorrhea with most/all of her bowel movements.  She says she takes 2-3 40,000 unit capsules of Zenpep with her meals.  She says she has never been on an antispasmodic such as Bentyl, she says she did try Questran in the past as it was felt that her symptomatology may be postcholecystectomy related in nature, but she says that she got severe abdominal pain with this so had to stop the medication quickly.  She says she does still have issues with bloating.  Denies any rectal bleeding or melena.  Reports her weight has been stable.  Does not believe her abdominal cramping is associated with food intake specifically.        REVIEW OF SYSTEMS:      CONSTITUTIONAL: Denies any fever, chills, or rigors. Good appetite, and no recent weight loss.  HEENT: No earache or tinnitus. Denies hearing loss or visual disturbances.  CARDIOVASCULAR: No chest pain or palpitations.   RESPIRATORY: Denies any cough, hemoptysis, shortness of breath or dyspnea on exertion.  GASTROINTESTINAL: As noted in the History of Present Illness.   GENITOURINARY: No problems with urination. Denies any hematuria or dysuria.  NEUROLOGIC: No dizziness or vertigo, denies headaches.   MUSCULOSKELETAL: Denies any muscle or joint pain.   SKIN: Denies skin rashes or itching.   ENDOCRINE: Denies excessive thirst. Denies intolerance  to heat or cold.  PSYCHOSOCIAL: Denies depression or anxiety. Denies any recent memory loss.     Past Medical History:   Diagnosis Date    Fatty liver 2022    GERD (gastroesophageal reflux disease) 2001    Hyperlipidemia 2005    Hypertension 2005    Lactose intolerance 1999      Past Surgical History:   Procedure Laterality Date    ABDOMINAL SURGERY  2000, 2001, 2002    APPENDECTOMY  2001    CHOLECYSTECTOMY  2002    COLONOSCOPY  2002, 2022    ENDOSCOPIC ULTRASOUND (UPPER)  2022    HYSTERECTOMY  2000    UPPER GASTROINTESTINAL ENDOSCOPY  2002, 2022     Social History     Socioeconomic History    Marital status: /Civil Union     Spouse name: Not on file    Number of children: Not on file    Years of education: Not on file    Highest education level: Not on file   Occupational History    Not on file   Tobacco Use    Smoking status: Never    Smokeless tobacco: Never   Vaping Use    Vaping status: Never Used   Substance and Sexual Activity    Alcohol use: Never    Drug use: Never    Sexual activity: Yes     Partners: Male     Birth control/protection: Post-menopausal, Surgical   Other Topics Concern    Not on file   Social History Narrative    Not on file     Social Determinants of Health     Financial Resource Strain: Not on file   Food Insecurity: Not on file   Transportation Needs: Not on file   Physical Activity: Not on file   Stress: Not on file   Social Connections: Not on file   Intimate Partner Violence: Not on file   Housing Stability: Not on file     Family History   Problem Relation Age of Onset    Arthritis Mother     Hyperlipidemia Mother     Hypertension Mother     Stroke Mother     Diabetes Father     Heart disease Father         CHF    Hyperlipidemia Father     Hypertension Father     Kidney disease Father         Kidney Failure    Cancer Maternal Grandmother         Pancreatic cancer    Asthma Paternal Grandmother     Breast cancer Maternal Aunt     Breast cancer Cousin     Celiac disease Maternal  "Aunt     Hyperlipidemia Sister     Hypertension Sister      Penicillin g, Ciprofloxacin, Clarithromycin, Monascus purpureus went yeast, Oxycodone-acetaminophen, Rosuvastatin, and Sulfamethoxazole-trimethoprim  Current Outpatient Medications   Medication Sig Dispense Refill    acetaminophen-codeine (TYLENOL #3) 300-30 mg per tablet       albuterol (2.5 mg/3 mL) 0.083 % nebulizer solution       albuterol (PROVENTIL HFA,VENTOLIN HFA) 90 mcg/act inhaler       budesonide (PULMICORT) 0.5 mg/2 mL nebulizer solution Inhale      dicyclomine (BENTYL) 10 mg capsule Take 1 capsule (10 mg total) by mouth 3 (three) times a day as needed (bowel spasm, diarrhea) 90 capsule 1    Dulera 200-5 MCG/ACT inhaler       estradiol (ESTRACE) 1 mg tablet       ezetimibe (ZETIA) 10 mg tablet Take by mouth      levocetirizine (XYZAL) 5 MG tablet       lidocaine (LIDODERM) 5 %       meloxicam (MOBIC) 15 mg tablet       metaxalone (SKELAXIN) 800 mg tablet       montelukast (SINGULAIR) 10 mg tablet       Pancrelipase, Lip-Prot-Amyl, (Zenpep) 75598-323227 units CPEP Take 180,000 units of lipase by mouth 3 (three) times a day with meals 3 capsules 3 times daily with meals 270 capsule 5    pantoprazole (PROTONIX) 40 mg tablet       pravastatin (PRAVACHOL) 10 mg tablet       Progesterone 200 MG CAPS       telmisartan-hydrochlorothiazide (MICARDIS HCT) 80-25 MG per tablet       traMADol (ULTRAM) 50 mg tablet       albuterol (2.5 mg/3 mL) 0.083 % nebulizer solution       sucralfate (CARAFATE) 1 g/10 mL suspension Take 10 mL (1 g total) by mouth 2 (two) times a day 600 mL 0     No current facility-administered medications for this visit.       Blood pressure 136/86, pulse 104, height 5' 6\" (1.676 m), weight 83 kg (183 lb), SpO2 97%.    PHYSICAL EXAM:      General Appearance:   Alert, cooperative, no distress, appears stated age    HEENT:   Normocephalic, atraumatic, anicteric.     Neck:  Supple, symmetrical, trachea midline, no adenopathy;    thyroid: no " "enlargement/tenderness/nodules; no carotid  bruit or JVD    Lungs:   Clear to auscultation bilaterally; no rales, rhonchi or wheezing; respirations unlabored    Heart::   S1 and S2 normal; regular rate and rhythm; no murmur, rub, or gallop.   Abdomen:   Soft, non-tender, non-distended; normal bowel sounds; no masses, no organomegaly    Extremities: No edema, erythema, wounds, rashes   Rectal:   Deferred                      Lab Results   Component Value Date    WBC 6.60 02/08/2024    HGB 11.4 (L) 02/08/2024    HCT 35.6 02/08/2024    MCV 85 02/08/2024     (H) 02/08/2024     Lab Results   Component Value Date    CALCIUM 9.4 02/08/2024    K 3.7 02/08/2024    CO2 26 02/08/2024    CL 99 02/08/2024    BUN 29 (H) 02/08/2024    CREATININE 0.99 02/08/2024     Lab Results   Component Value Date    ALT 17 02/08/2024    AST 22 02/08/2024    ALKPHOS 74 02/08/2024     No results found for: \"INR\", \"PROTIME\"    EGD    Addendum Date: 12/26/2023    [address] [address] [phone] DATE OF SERVICE: 12/20/23 PHYSICIAN(S): Attending: Mode Gamble MD Fellow: No Staff Documented INDICATION: Dyspepsia POST-OP DIAGNOSIS: See the impression below. PREPROCEDURE: Informed consent was obtained for the procedure, including sedation.  Risks of perforation, hemorrhage, adverse drug reaction and aspiration were discussed. The patient was placed in the left lateral decubitus position. Patient was explained about the risks and benefits of the procedure. Risks including but not limited to bleeding, infection, and perforation were explained in detail. Also explained about less than 100% sensitivity with the exam and other alternatives. PROCEDURE: EGD DETAILS OF PROCEDURE: Patient was taken to the procedure room where a time out was performed to confirm correct patient and correct procedure. The patient underwent monitored anesthesia care, which was administered by an anesthesia " professional. The patient's blood pressure, heart rate, level of consciousness, respirations and oxygen were monitored throughout the procedure. The scope was advanced to the second part of the duodenum. Retroflexion was performed in the fundus. The patient experienced no blood loss. The procedure was not difficult. The patient tolerated the procedure well. There were no apparent adverse events. ANESTHESIA INFORMATION: ASA: III Anesthesia Type: IV Sedation with Anesthesia MEDICATIONS: No administrations occurring from 1059 to 1128 on 12/20/23 FINDINGS: Regular Z-line 38 cm from the incisors Performed multiple random forceps biopsies in the middle third of the esophagus and lower third of the esophagus to rule out eosinophilic esophagitis Dilated in the cricopharynx and upper third of the esophagus with Savary-Oliver dilator using guidewire from 51 Fr starting size. Dilation caused improved passage of the scope and improved lumen appearance. Mild Narrowing noted just below the UES, no obstructing lesions/strictures seen, therefore an empiric dilation was performed using 51 fr. Savory dilator with improved passage of the scope. No mucosal tear was seen, biopsies of the esophagus were obtained to r/o EOE. Moderate, patchy edematous and erythematous mucosa in the fundus of the stomach and body of the stomach; performed cold forceps biopsy to rule out H. pylori The duodenal bulb, 1st part of the duodenum and 2nd part of the duodenum appeared normal. Performed random biopsy using biopsy forceps to rule out celiac disease. One semi-pedunculated fundic gland polyp measuring smaller than 5 mm in the fundus of the stomach; performed cold snare with complete en bloc removal and retrieved specimen SPECIMENS: ID Type Source Tests Collected by Time Destination 1 : Duodenum bs- r/o celiac Tissue Duodenum TISSUE EXAM Mode Gamble MD 12/20/2023 11:16 AM  2 : Gastric bx- r/o h. pylori Tissue Stomach TISSUE EXAM Mode ISIDRO  MD Mavis 12/20/2023 11:17 AM  3 : gastric polyp- cold snare Tissue Stomach TISSUE EXAM Mode Gamble MD 12/20/2023 11:19 AM  4 : esophagus bx r/o e.o.e. Tissue Esophagus TISSUE EXAM Mode Gamble MD 12/20/2023 11:24 AM  IMPRESSION: Performed forceps biopsies in the middle third of the esophagus and lower third of the esophagus to rule out eosinophilic esophagitis Dilated in the cricopharynx and upper third of the esophagus with Savary-Oliver dilator. Dilation caused improved passage of the scope and improved lumen appearance Moderate edematous and erythematous mucosa in the fundus of the stomach and body of the stomach; performed cold forceps biopsy to rule out H. pylori The duodenal bulb, 1st part of the duodenum and 2nd part of the duodenum appeared normal. Performed random biopsy to rule out celiac disease. Subcentimeter fundic gland polyp in the fundus of the stomach was removed with cold snare RECOMMENDATION:  There is no recommended follow-up for this procedure. Assess response to dilation. Continue protonix 40 mg daily and add famotidine 20 mg. Avoid NSAIDs. Follow GERD diet.   Mode Gamble MD     Result Date: 12/26/2023  Impression: Performed forceps biopsies in the middle third of the esophagus and lower third of the esophagus to rule out eosinophilic esophagitis Dilated in the cricopharynx and upper third of the esophagus with Savary-Oliver dilator. Dilation caused improved passage of the scope and improved lumen appearance Moderate edematous and erythematous mucosa in the fundus of the stomach and body of the stomach; performed cold forceps biopsy to rule out H. pylori The duodenal bulb, 1st part of the duodenum and 2nd part of the duodenum appeared normal. Performed random biopsy to rule out celiac disease. RECOMMENDATION:  There is no recommended follow-up for this procedure. Assess response to dilation. Continue protonix 40 mg daily and add famotidine 20 mg. Avoid NSAIDs. Follow GERD  diet.   Mode Gamble MD

## 2024-02-16 LAB
A-TOCOPHEROL VIT E SERPL-MCNC: 18.7 MG/L (ref 7–25.1)
GAMMA TOCOPHEROL SERPL-MCNC: 0.1 MG/L (ref 0.5–5.5)
VIT A SERPL-MCNC: 56.9 UG/DL (ref 20.1–62)

## 2024-02-17 LAB — PHYTONADIONE SERPL-MCNC: 0.13 NG/ML (ref 0.1–2.2)

## 2024-03-12 ENCOUNTER — HOSPITAL ENCOUNTER (OUTPATIENT)
Dept: ULTRASOUND IMAGING | Facility: HOSPITAL | Age: 59
Discharge: HOME | End: 2024-03-12
Attending: STUDENT IN AN ORGANIZED HEALTH CARE EDUCATION/TRAINING PROGRAM
Payer: COMMERCIAL

## 2024-03-12 DIAGNOSIS — M19.90 ARTHRITIS: ICD-10-CM

## 2024-03-12 PROCEDURE — 76882 US LMTD JT/FCL EVL NVASC XTR: CPT

## 2024-03-13 ENCOUNTER — OFFICE VISIT (OUTPATIENT)
Dept: RHEUMATOLOGY | Facility: CLINIC | Age: 59
End: 2024-03-13
Payer: COMMERCIAL

## 2024-03-13 ENCOUNTER — APPOINTMENT (OUTPATIENT)
Dept: LAB | Facility: AMBULARY SURGERY CENTER | Age: 59
End: 2024-03-13
Payer: COMMERCIAL

## 2024-03-13 VITALS
SYSTOLIC BLOOD PRESSURE: 130 MMHG | WEIGHT: 188 LBS | DIASTOLIC BLOOD PRESSURE: 84 MMHG | HEIGHT: 66 IN | BODY MASS INDEX: 30.22 KG/M2

## 2024-03-13 DIAGNOSIS — Z79.60 LONG-TERM USE OF IMMUNOSUPPRESSANT MEDICATION: ICD-10-CM

## 2024-03-13 DIAGNOSIS — Z00.6 ENCOUNTER FOR EXAMINATION FOR NORMAL COMPARISON OR CONTROL IN CLINICAL RESEARCH PROGRAM: ICD-10-CM

## 2024-03-13 DIAGNOSIS — L40.50 PSORIATIC ARTHRITIS (HCC): ICD-10-CM

## 2024-03-13 DIAGNOSIS — L40.50 PSORIATIC ARTHRITIS (HCC): Primary | ICD-10-CM

## 2024-03-13 LAB — URATE SERPL-MCNC: 4.2 MG/DL (ref 2–7.5)

## 2024-03-13 PROCEDURE — 99215 OFFICE O/P EST HI 40 MIN: CPT | Performed by: STUDENT IN AN ORGANIZED HEALTH CARE EDUCATION/TRAINING PROGRAM

## 2024-03-13 PROCEDURE — 36415 COLL VENOUS BLD VENIPUNCTURE: CPT

## 2024-03-13 PROCEDURE — 84550 ASSAY OF BLOOD/URIC ACID: CPT

## 2024-03-13 RX ORDER — PREDNISONE 5 MG/1
TABLET ORAL DAILY
Qty: 42 TABLET | Refills: 0 | Status: SHIPPED | OUTPATIENT
Start: 2024-03-13 | End: 2024-04-03

## 2024-03-13 RX ORDER — METHOTREXATE 2.5 MG/1
TABLET ORAL
Qty: 48 TABLET | Refills: 2 | Status: SHIPPED | OUTPATIENT
Start: 2024-03-13 | End: 2024-03-20

## 2024-03-13 RX ORDER — FOLIC ACID 1 MG/1
1 TABLET ORAL DAILY
Qty: 90 TABLET | Refills: 2 | Status: SHIPPED | OUTPATIENT
Start: 2024-03-13 | End: 2024-03-20

## 2024-03-13 NOTE — PATIENT INSTRUCTIONS
Get blood work (GFR, creatinine, calcium, LFTs, CBC) 2 and 4 weeks after starting methotrexate, then every 3 months thereafter.    While on methotrexate take folic acid one pill daily

## 2024-03-13 NOTE — PROGRESS NOTES
Rheumatology Follow-up Visit  3/13/2024     CC: discuss lab results    Permanent History: First saw me for mildly elevated ESR 49 in Jan 2024. Had arthralgias that didn't sound overly inflammatory (no gelling, no robust response to NSAIDs) but given temporality (symptoms started soon after COVID infection) and enthesial tenderness to palpation I wanted to more thoroughly investigate for a peripheral SpA, particularly PsA.    Also felt that she had a component of FMS contributing to her all-over muscle achiness and tenderness to palpation.    Assessment: 58 y.o. female here for/with the above.    With objective evidence of inflammation will treat as PsA    Given bilateral MTP involvement, will check sUA though suspicion for gout is low    Discussed methotrexate r:b, patient does not drink EtOH often, she is amenable to try    Patient's rheumatologic disease(s) threaten long-term function if not appropriately treated.    Patient's rheumatologic medication(s) require intensive monitoring for toxicity.    Encounter Diagnosis     ICD-10-CM    1. Psoriatic arthritis (HCC)  L40.50 Uric acid     Glom Filt Rate, Estimated     Creatinine, serum     Calcium     Hepatic function panel     Glom Filt Rate, Estimated     Creatinine, serum     Calcium     Hepatic function panel     CBC and differential     CBC and differential     methotrexate 2.5 MG tablet     folic acid ( Folic Acid) 1 mg tablet     predniSONE 5 mg tablet      2. Long-term use of immunosuppressant medication  Z79.60 Glom Filt Rate, Estimated     Creatinine, serum     Calcium     Hepatic function panel     Glom Filt Rate, Estimated     Creatinine, serum     Calcium     Hepatic function panel     CBC and differential     CBC and differential          Plan:  -Start methotrexate + FA  -High risk monitoring labs  -Prednisone taper for now    Neil Ace DO, FLYNN Ace DO, [facility]      Interval History: MSU demonstrated  "unremarkable Radha's tendon but did show active synovitis bilateral 1st MTP.    Had PNA, had to go on prednisone, and the pain went away.    Having trouble grabbing with the R hand due to pain iaround the R 1st MCP.    When she washes her hair, she all of a sudden gets a horrible pain from the R wrist to the MCP 1st and is intense for a few minutes. Worse when she is using it.    Is now having a lot of joint stiffness in the mornings in the hands. Some in the top part of the foot. In the hands, takes hours to loosen up. On the prednisone this was much better.    Outpatient Medications Marked as Taking for the 3/13/24 encounter (Office Visit) with Neil Ace, DO   Medication    folic acid ( Folic Acid) 1 mg tablet    methotrexate 2.5 MG tablet    predniSONE 5 mg tablet       Social History     Tobacco Use    Smoking status: Never    Smokeless tobacco: Never   Vaping Use    Vaping status: Never Used   Substance Use Topics    Alcohol use: Never    Drug use: Never       Review of Systems:  Pertinent findings documented in HPI  ___________________________________    Physical Exam:    /84   Ht 5' 6\" (1.676 m)   Wt 85.3 kg (188 lb)   BMI 30.34 kg/m²     General: Well appearing, in no distress.   Eyes: Sclera non-icteric. EOMI  HENT: No oral ulcers. MMM.   Extremities: Warm, well perfused, no edema.   Neuro: Alert and oriented. No gross focal neurological deficits.   Skin: No rashes.  MSK exam: tenderness to palpation bilateral MTP squeeze, no active synovitis appreciated in hands today  ____________________________    Lab Results: relevant labs reviewed    Imaging Results: relevant images reviewed  "

## 2024-03-19 ENCOUNTER — TELEPHONE (OUTPATIENT)
Age: 59
End: 2024-03-19

## 2024-03-19 ENCOUNTER — NURSE TRIAGE (OUTPATIENT)
Age: 59
End: 2024-03-19

## 2024-03-19 DIAGNOSIS — L40.50 PSORIATIC ARTHRITIS (HCC): Primary | ICD-10-CM

## 2024-03-19 NOTE — TELEPHONE ENCOUNTER
We can try leflunomide instead. Daily oral medication, same blood work monitoring as methotrexate. Should be a lower risk of GI side effects    If she is amenable let me know and I can prescribe it    If she is stopping methotrexate she can also stop folic acid

## 2024-03-19 NOTE — TELEPHONE ENCOUNTER
Spoke with patient, she is ok with switching to Leflunomide. Informed she can stop the folic acid as well.

## 2024-03-19 NOTE — TELEPHONE ENCOUNTER
"Answer Assessment - Initial Assessment Questions  1. NAME of MEDICATION: \"What medicine are you calling about?\"      Methotrexate  2. QUESTION: \"What is your question?\" (e.g., medication refill, side effect)      Side effects  3. PRESCRIBING HCP: \"Who prescribed it?\" Reason: if prescribed by specialist, call should be referred to that group.      Dr. Ace  4. SYMPTOMS: \"Do you have any symptoms?\"      Nausea/vomiting/diarrhea/fatigue  5. SEVERITY: If symptoms are present, ask \"Are they mild, moderate or severe?\"      Improving now    Protocols used: Medication Question Call-ADULT-[place]    "

## 2024-03-19 NOTE — TELEPHONE ENCOUNTER
Pt of Dr. ISIDRO with hx of PA. LOV 3/13/24    Pt's spouse calling regarding Methotrexate. States pt started Thursday 3/14. Within a couple of days started experiencing severe nausea/vomiting/diarrhea. Vomiting has subsided but still having loose stools 5-6 times per day. No concern for dehydration. Replacing fluids appropriately, urinating normally. Pt does not want to continue with Methotrexate. Looking for further recommendations.

## 2024-03-20 RX ORDER — LEFLUNOMIDE 10 MG/1
10 TABLET ORAL DAILY
Qty: 30 TABLET | Refills: 2 | Status: SHIPPED | OUTPATIENT
Start: 2024-03-20

## 2024-03-20 NOTE — TELEPHONE ENCOUNTER
Pt's spouse calling in to check status. Informed Leflunomide was sent to pt's pharmacy. No further questions at this time.

## 2024-03-27 ENCOUNTER — TELEPHONE (OUTPATIENT)
Dept: LAB | Facility: HOSPITAL | Age: 59
End: 2024-03-27

## 2024-03-27 ENCOUNTER — LAB (OUTPATIENT)
Dept: LAB | Facility: AMBULARY SURGERY CENTER | Age: 59
End: 2024-03-27
Payer: COMMERCIAL

## 2024-03-27 DIAGNOSIS — L40.50 PSORIATIC ARTHRITIS (HCC): ICD-10-CM

## 2024-03-27 DIAGNOSIS — Z79.60 LONG-TERM USE OF IMMUNOSUPPRESSANT MEDICATION: ICD-10-CM

## 2024-03-27 LAB
ALBUMIN SERPL BCP-MCNC: 4.2 G/DL (ref 3.5–5)
ALP SERPL-CCNC: 66 U/L (ref 34–104)
ALT SERPL W P-5'-P-CCNC: 18 U/L (ref 7–52)
AST SERPL W P-5'-P-CCNC: 16 U/L (ref 13–39)
BASOPHILS # BLD AUTO: 0.07 THOUSANDS/ÂΜL (ref 0–0.1)
BASOPHILS NFR BLD AUTO: 1 % (ref 0–1)
BILIRUB DIRECT SERPL-MCNC: 0.03 MG/DL (ref 0–0.2)
BILIRUB SERPL-MCNC: 0.34 MG/DL (ref 0.2–1)
CALCIUM SERPL-MCNC: 8.9 MG/DL (ref 8.4–10.2)
CREAT SERPL-MCNC: 0.87 MG/DL (ref 0.6–1.3)
EOSINOPHIL # BLD AUTO: 0.13 THOUSAND/ÂΜL (ref 0–0.61)
EOSINOPHIL NFR BLD AUTO: 1 % (ref 0–6)
ERYTHROCYTE [DISTWIDTH] IN BLOOD BY AUTOMATED COUNT: 16.6 % (ref 11.6–15.1)
GFR SERPL CREATININE-BSD FRML MDRD: 73 ML/MIN/1.73SQ M
HCT VFR BLD AUTO: 35.4 % (ref 34.8–46.1)
HGB BLD-MCNC: 11 G/DL (ref 11.5–15.4)
IMM GRANULOCYTES # BLD AUTO: 0.04 THOUSAND/UL (ref 0–0.2)
IMM GRANULOCYTES NFR BLD AUTO: 0 % (ref 0–2)
LYMPHOCYTES # BLD AUTO: 3.06 THOUSANDS/ÂΜL (ref 0.6–4.47)
LYMPHOCYTES NFR BLD AUTO: 30 % (ref 14–44)
MCH RBC QN AUTO: 27 PG (ref 26.8–34.3)
MCHC RBC AUTO-ENTMCNC: 31.1 G/DL (ref 31.4–37.4)
MCV RBC AUTO: 87 FL (ref 82–98)
MONOCYTES # BLD AUTO: 1.18 THOUSAND/ÂΜL (ref 0.17–1.22)
MONOCYTES NFR BLD AUTO: 11 % (ref 4–12)
NEUTROPHILS # BLD AUTO: 5.88 THOUSANDS/ÂΜL (ref 1.85–7.62)
NEUTS SEG NFR BLD AUTO: 57 % (ref 43–75)
NRBC BLD AUTO-RTO: 0 /100 WBCS
PLATELET # BLD AUTO: 465 THOUSANDS/UL (ref 149–390)
PMV BLD AUTO: 9.1 FL (ref 8.9–12.7)
PROT SERPL-MCNC: 7 G/DL (ref 6.4–8.4)
RBC # BLD AUTO: 4.08 MILLION/UL (ref 3.81–5.12)
WBC # BLD AUTO: 10.36 THOUSAND/UL (ref 4.31–10.16)

## 2024-03-27 PROCEDURE — 82565 ASSAY OF CREATININE: CPT

## 2024-03-27 PROCEDURE — 36415 COLL VENOUS BLD VENIPUNCTURE: CPT

## 2024-03-27 PROCEDURE — 80076 HEPATIC FUNCTION PANEL: CPT

## 2024-03-27 PROCEDURE — 85025 COMPLETE CBC W/AUTO DIFF WBC: CPT

## 2024-03-29 NOTE — RESULT ENCOUNTER NOTE
Mild leukocytosis, maybe related to current prednisone use, otherwise not concerning. Thrombocytosis stable

## 2024-04-03 LAB
APOB+LDLR+PCSK9 GENE MUT ANL BLD/T: NOT DETECTED
BRCA1+BRCA2 DEL+DUP + FULL MUT ANL BLD/T: NOT DETECTED
MLH1+MSH2+MSH6+PMS2 GN DEL+DUP+FUL M: NOT DETECTED

## 2024-04-11 ENCOUNTER — APPOINTMENT (OUTPATIENT)
Dept: LAB | Facility: MEDICAL CENTER | Age: 59
End: 2024-04-11
Payer: COMMERCIAL

## 2024-04-11 DIAGNOSIS — Z79.60 LONG-TERM USE OF IMMUNOSUPPRESSANT MEDICATION: ICD-10-CM

## 2024-04-11 DIAGNOSIS — L40.50 PSORIATIC ARTHRITIS (HCC): ICD-10-CM

## 2024-04-11 LAB
ALBUMIN SERPL BCP-MCNC: 4.2 G/DL (ref 3.5–5)
ALP SERPL-CCNC: 67 U/L (ref 34–104)
ALT SERPL W P-5'-P-CCNC: 19 U/L (ref 7–52)
AST SERPL W P-5'-P-CCNC: 22 U/L (ref 13–39)
BASOPHILS # BLD AUTO: 0.04 THOUSANDS/ÂΜL (ref 0–0.1)
BASOPHILS NFR BLD AUTO: 1 % (ref 0–1)
BILIRUB DIRECT SERPL-MCNC: 0.07 MG/DL (ref 0–0.2)
BILIRUB SERPL-MCNC: 0.27 MG/DL (ref 0.2–1)
CALCIUM SERPL-MCNC: 9.1 MG/DL (ref 8.4–10.2)
CREAT SERPL-MCNC: 0.92 MG/DL (ref 0.6–1.3)
EOSINOPHIL # BLD AUTO: 0.28 THOUSAND/ÂΜL (ref 0–0.61)
EOSINOPHIL NFR BLD AUTO: 5 % (ref 0–6)
ERYTHROCYTE [DISTWIDTH] IN BLOOD BY AUTOMATED COUNT: 16.9 % (ref 11.6–15.1)
GFR SERPL CREATININE-BSD FRML MDRD: 68 ML/MIN/1.73SQ M
HCT VFR BLD AUTO: 36.7 % (ref 34.8–46.1)
HGB BLD-MCNC: 11.5 G/DL (ref 11.5–15.4)
IMM GRANULOCYTES # BLD AUTO: 0.01 THOUSAND/UL (ref 0–0.2)
IMM GRANULOCYTES NFR BLD AUTO: 0 % (ref 0–2)
LYMPHOCYTES # BLD AUTO: 1.5 THOUSANDS/ÂΜL (ref 0.6–4.47)
LYMPHOCYTES NFR BLD AUTO: 26 % (ref 14–44)
MCH RBC QN AUTO: 27.4 PG (ref 26.8–34.3)
MCHC RBC AUTO-ENTMCNC: 31.3 G/DL (ref 31.4–37.4)
MCV RBC AUTO: 87 FL (ref 82–98)
MONOCYTES # BLD AUTO: 0.83 THOUSAND/ÂΜL (ref 0.17–1.22)
MONOCYTES NFR BLD AUTO: 14 % (ref 4–12)
NEUTROPHILS # BLD AUTO: 3.17 THOUSANDS/ÂΜL (ref 1.85–7.62)
NEUTS SEG NFR BLD AUTO: 54 % (ref 43–75)
NRBC BLD AUTO-RTO: 0 /100 WBCS
PLATELET # BLD AUTO: 370 THOUSANDS/UL (ref 149–390)
PMV BLD AUTO: 9.7 FL (ref 8.9–12.7)
PROT SERPL-MCNC: 7.2 G/DL (ref 6.4–8.4)
RBC # BLD AUTO: 4.2 MILLION/UL (ref 3.81–5.12)
WBC # BLD AUTO: 5.83 THOUSAND/UL (ref 4.31–10.16)

## 2024-04-11 PROCEDURE — 85025 COMPLETE CBC W/AUTO DIFF WBC: CPT

## 2024-04-11 PROCEDURE — 80076 HEPATIC FUNCTION PANEL: CPT

## 2024-04-11 PROCEDURE — 82565 ASSAY OF CREATININE: CPT

## 2024-04-11 PROCEDURE — 36415 COLL VENOUS BLD VENIPUNCTURE: CPT

## 2024-04-15 ENCOUNTER — NURSE TRIAGE (OUTPATIENT)
Age: 59
End: 2024-04-15

## 2024-04-15 NOTE — TELEPHONE ENCOUNTER
"Patient call:  Pt stated provider: Dr. Ace    Actionable item: Appointment rescheduled    What is the reason for the call/chief complaint?    Reports severe wrist, hands, neck, shoulder, knee, and foot pain progressively worsening over the past week. Also states that she had a low grade 100.1 F fever today. Denies other symptoms.    They believe this may be associated with the medication change that occurred during the last visit.     Taking tramadol already for different diagnosis.     Dispo:Appointment rescheduled. Continue with medication regime.  Informed to call [facility] with worsening symptoms.  Agrees with plan.   All questions answered.     Reason for Disposition   SEVERE pain (e.g., excruciating, unable to use hand at all)    Answer Assessment - Initial Assessment Questions  1. ONSET: \"When did the pain start?\"      1 week  2. LOCATION: \"Where is the pain located?\"      Wrist, hands, neck, and shoulder  3. PAIN: \"How bad is the pain?\" (Scale 1-10; or mild, moderate, severe)    - MILD (1-3): doesn't interfere with normal activities    - MODERATE (4-7): interferes with normal activities (e.g., work or school) or awakens from sleep    - SEVERE (8-10): excruciating pain, unable to use hand at all      Progressively worsening over past week   4. WORK OR EXERCISE: \"Has there been any recent work or exercise that involved this part of the body?\"      no  5. CAUSE: \"What do you think is causing the pain?\"      Flare up  6. AGGRAVATING FACTORS: \"What makes the pain worse?\" (e.g., using computer)      Nothing makes better  7. OTHER SYMPTOMS: \"Do you have any other symptoms?\" (e.g., neck pain, swelling, rash, numbness, fever)      Low grade fever    Protocols used: Hand and Wrist Pain-ADULT-[place]    "

## 2024-04-18 ENCOUNTER — OFFICE VISIT (OUTPATIENT)
Dept: RHEUMATOLOGY | Facility: CLINIC | Age: 59
End: 2024-04-18

## 2024-04-18 VITALS
OXYGEN SATURATION: 98 % | BODY MASS INDEX: 29.73 KG/M2 | HEIGHT: 66 IN | SYSTOLIC BLOOD PRESSURE: 138 MMHG | HEART RATE: 109 BPM | WEIGHT: 185 LBS | DIASTOLIC BLOOD PRESSURE: 82 MMHG

## 2024-04-18 DIAGNOSIS — L40.50 PSORIATIC ARTHRITIS (HCC): Primary | ICD-10-CM

## 2024-04-18 RX ORDER — PREDNISONE 5 MG/1
TABLET ORAL DAILY
Qty: 59 TABLET | Refills: 0 | Status: SHIPPED | OUTPATIENT
Start: 2024-04-18 | End: 2024-05-17

## 2024-04-18 NOTE — PROGRESS NOTES
Rheumatology Follow-up Visit  4/18/2024     CC: discuss lab results    Permanent History: First saw me for mildly elevated ESR 49 in Jan 2024. Had arthralgias that didn't sound overly inflammatory (no gelling, no robust response to NSAIDs) but given temporality (symptoms started soon after COVID infection) and enthesial tenderness to palpation I wanted to more thoroughly investigate for a peripheral SpA, particularly PsA. MSU of her feet did show active synovitis of bilateral 1st MTPs.    Also felt that she had a component of FMS contributing to her all-over muscle achiness and tenderness to palpation.    Assessment: 58 y.o. female here for/with the above.    Possibly a flare coinciding with beginning LEF rather than an LEF SE? Will try prednisone taper, patient agreeable     Patient's rheumatologic disease(s) threaten long-term function if not appropriately treated.    Patient's rheumatologic medication(s) require intensive monitoring for toxicity.    Encounter Diagnosis     ICD-10-CM    1. Psoriatic arthritis (HCC)  L40.50 predniSONE 5 mg tablet            Plan:  -Prednisone taper   -RTC at end of taper    Neil Ace DO, FLYNN Ace DO, FLYNN  [facility]      Interval History: Tried methotrexate, developed severe GI SEs. Tried LEF, developed T 100.1 with severe wrist, hand, neck, shoulder, knee, foot pain.    She has continued on LEF    Is off all steroids currently    Outpatient Medications Marked as Taking for the 4/18/24 encounter (Office Visit) with Neil Ace DO   Medication    acetaminophen-codeine (TYLENOL #3) 300-30 mg per tablet    albuterol (2.5 mg/3 mL) 0.083 % nebulizer solution    albuterol (PROVENTIL HFA,VENTOLIN HFA) 90 mcg/act inhaler    budesonide (PULMICORT) 0.5 mg/2 mL nebulizer solution    dicyclomine (BENTYL) 10 mg capsule    Dulera 200-5 MCG/ACT inhaler    estradiol (ESTRACE) 1 mg tablet    ezetimibe (ZETIA) 10 mg tablet    leflunomide (ARAVA) 10 MG tablet     "levocetirizine (XYZAL) 5 MG tablet    lidocaine (LIDODERM) 5 %    meloxicam (MOBIC) 15 mg tablet    metaxalone (SKELAXIN) 800 mg tablet    montelukast (SINGULAIR) 10 mg tablet    Pancrelipase, Lip-Prot-Amyl, (Zenpep) 27038-999100 units CPEP    pantoprazole (PROTONIX) 40 mg tablet    pravastatin (PRAVACHOL) 10 mg tablet    predniSONE 5 mg tablet    Progesterone 200 MG CAPS    telmisartan-hydrochlorothiazide (MICARDIS HCT) 80-25 MG per tablet    traMADol (ULTRAM) 50 mg tablet       Social History     Tobacco Use    Smoking status: Never    Smokeless tobacco: Never   Vaping Use    Vaping status: Never Used   Substance Use Topics    Alcohol use: Never    Drug use: Never       Review of Systems:  Pertinent findings documented in HPI  ___________________________________    Physical Exam:    /82   Pulse (!) 109   Ht 5' 6\" (1.676 m)   Wt 83.9 kg (185 lb)   SpO2 98%   BMI 29.86 kg/m²     General: Well appearing, in no distress.   Eyes: Sclera non-icteric. EOMI  HENT: No oral ulcers. MMM.   Extremities: Warm, well perfused, no edema.   Neuro: Alert and oriented. No gross focal neurological deficits.   Skin: No rashes.  MSK exam: tenderness to palpation bilateral shoulders, multiple MCPs/PIPs with some subtle synovitis and warmth of R 1st MCP, tenderness to palpation bilateral knees. No spinal tenderness to palpation. No large muscle group tenderness to palpation.  ____________________________    Lab Results: relevant labs reviewed    Imaging Results: relevant images reviewed  "

## 2024-05-15 ENCOUNTER — APPOINTMENT (OUTPATIENT)
Dept: LAB | Facility: AMBULARY SURGERY CENTER | Age: 59
End: 2024-05-15
Payer: COMMERCIAL

## 2024-05-15 DIAGNOSIS — Z79.60 LONG-TERM USE OF IMMUNOSUPPRESSANT MEDICATION: ICD-10-CM

## 2024-05-15 DIAGNOSIS — L40.50 PSORIATIC ARTHRITIS (HCC): ICD-10-CM

## 2024-05-15 LAB
ALBUMIN SERPL BCP-MCNC: 4.4 G/DL (ref 3.5–5)
ALP SERPL-CCNC: 66 U/L (ref 34–104)
ALT SERPL W P-5'-P-CCNC: 17 U/L (ref 7–52)
AST SERPL W P-5'-P-CCNC: 18 U/L (ref 13–39)
BASOPHILS # BLD AUTO: 0.02 THOUSANDS/ÂΜL (ref 0–0.1)
BASOPHILS NFR BLD AUTO: 0 % (ref 0–1)
BILIRUB DIRECT SERPL-MCNC: 0.02 MG/DL (ref 0–0.2)
BILIRUB SERPL-MCNC: 0.28 MG/DL (ref 0.2–1)
CALCIUM SERPL-MCNC: 9.5 MG/DL (ref 8.4–10.2)
CREAT SERPL-MCNC: 1.06 MG/DL (ref 0.6–1.3)
EOSINOPHIL # BLD AUTO: 0.01 THOUSAND/ÂΜL (ref 0–0.61)
EOSINOPHIL NFR BLD AUTO: 0 % (ref 0–6)
ERYTHROCYTE [DISTWIDTH] IN BLOOD BY AUTOMATED COUNT: 16.7 % (ref 11.6–15.1)
GFR SERPL CREATININE-BSD FRML MDRD: 58 ML/MIN/1.73SQ M
HCT VFR BLD AUTO: 35.6 % (ref 34.8–46.1)
HGB BLD-MCNC: 11.3 G/DL (ref 11.5–15.4)
IMM GRANULOCYTES # BLD AUTO: 0.05 THOUSAND/UL (ref 0–0.2)
IMM GRANULOCYTES NFR BLD AUTO: 0 % (ref 0–2)
LYMPHOCYTES # BLD AUTO: 0.99 THOUSANDS/ÂΜL (ref 0.6–4.47)
LYMPHOCYTES NFR BLD AUTO: 9 % (ref 14–44)
MCH RBC QN AUTO: 27 PG (ref 26.8–34.3)
MCHC RBC AUTO-ENTMCNC: 31.7 G/DL (ref 31.4–37.4)
MCV RBC AUTO: 85 FL (ref 82–98)
MONOCYTES # BLD AUTO: 0.35 THOUSAND/ÂΜL (ref 0.17–1.22)
MONOCYTES NFR BLD AUTO: 3 % (ref 4–12)
NEUTROPHILS # BLD AUTO: 10.06 THOUSANDS/ÂΜL (ref 1.85–7.62)
NEUTS SEG NFR BLD AUTO: 88 % (ref 43–75)
NRBC BLD AUTO-RTO: 0 /100 WBCS
PLATELET # BLD AUTO: 418 THOUSANDS/UL (ref 149–390)
PMV BLD AUTO: 9.3 FL (ref 8.9–12.7)
PROT SERPL-MCNC: 8 G/DL (ref 6.4–8.4)
RBC # BLD AUTO: 4.19 MILLION/UL (ref 3.81–5.12)
WBC # BLD AUTO: 11.48 THOUSAND/UL (ref 4.31–10.16)

## 2024-05-15 PROCEDURE — 85025 COMPLETE CBC W/AUTO DIFF WBC: CPT

## 2024-05-15 PROCEDURE — 36415 COLL VENOUS BLD VENIPUNCTURE: CPT

## 2024-05-15 PROCEDURE — 80076 HEPATIC FUNCTION PANEL: CPT

## 2024-05-15 PROCEDURE — 82565 ASSAY OF CREATININE: CPT

## 2024-05-23 ENCOUNTER — OFFICE VISIT (OUTPATIENT)
Dept: RHEUMATOLOGY | Facility: CLINIC | Age: 59
End: 2024-05-23
Payer: COMMERCIAL

## 2024-05-23 VITALS
HEIGHT: 66 IN | DIASTOLIC BLOOD PRESSURE: 80 MMHG | SYSTOLIC BLOOD PRESSURE: 160 MMHG | BODY MASS INDEX: 30.05 KG/M2 | WEIGHT: 187 LBS

## 2024-05-23 DIAGNOSIS — Z79.60 LONG-TERM USE OF IMMUNOSUPPRESSANT MEDICATION: ICD-10-CM

## 2024-05-23 DIAGNOSIS — I74.9 ARTERIAL THROMBOSIS (HCC): ICD-10-CM

## 2024-05-23 DIAGNOSIS — L40.50 PSORIATIC ARTHRITIS (HCC): Primary | ICD-10-CM

## 2024-05-23 DIAGNOSIS — H66.90 EAR INFECTION: ICD-10-CM

## 2024-05-23 PROCEDURE — 99215 OFFICE O/P EST HI 40 MIN: CPT | Performed by: STUDENT IN AN ORGANIZED HEALTH CARE EDUCATION/TRAINING PROGRAM

## 2024-05-23 RX ORDER — LEFLUNOMIDE 20 MG/1
20 TABLET ORAL DAILY
Qty: 90 TABLET | Refills: 1 | Status: SHIPPED | OUTPATIENT
Start: 2024-05-23

## 2024-05-23 RX ORDER — DOXYCYCLINE 100 MG/1
CAPSULE ORAL
COMMUNITY
Start: 2024-05-23 | End: 2024-06-02

## 2024-05-23 RX ORDER — CODEINE SULFATE 30 MG/1
TABLET ORAL
COMMUNITY
Start: 2024-03-19

## 2024-05-23 RX ORDER — MELOXICAM 15 MG/1
15 TABLET ORAL DAILY
Qty: 90 TABLET | Refills: 1 | Status: SHIPPED | OUTPATIENT
Start: 2024-05-23

## 2024-05-23 NOTE — ASSESSMENT & PLAN NOTE
Not doing great on leflunomide 10 mg daily.    Currently getting treated for ear infection, advised her to stop leflunomide until after antibiotics finished and infection is resolved.  Will restart her at 20 mg daily.    In terms of escalating DMARD therapy; cannot add methotrexate because of past intolerance.  Cannot use sulfasalazine due to sulfa allergy.  Will have to escalate next to a biologic DMARD.    Has had gastric issues in the past with naproxen.  Has been taking meloxicam as needed, which does help her symptoms and she does tolerate.  Will try meloxicam scheduled daily for now, cannot use Celebrex because of sulfa allergy.  If meloxicam causes too many gastric issues, or is not adequate, then we will discuss low-dose prednisone.

## 2024-05-23 NOTE — PATIENT INSTRUCTIONS
STOP leflunomide until you are finished with antibiotics. If you have an infection or fever, stop leflunomide until the infection/fever resolves and/or antibiotics are finished    Start meloxicam daily    While on the prescribed NSAID, you should not take other NSAIDs. If you develop any GI upset, vomiting, abdominal pain, black/bloody stools, you should stop the prescribed NSAID immediately and let me know.

## 2024-06-17 ENCOUNTER — TELEPHONE (OUTPATIENT)
Age: 59
End: 2024-06-17

## 2024-06-17 DIAGNOSIS — L40.50 PSORIATIC ARTHRITIS (HCC): Primary | ICD-10-CM

## 2024-06-17 RX ORDER — PREDNISONE 5 MG/1
TABLET ORAL
Qty: 63 TABLET | Refills: 0 | Status: SHIPPED | OUTPATIENT
Start: 2024-06-17 | End: 2024-07-28

## 2024-06-17 NOTE — TELEPHONE ENCOUNTER
Since last appt pt has been on Meloxicam and was told to give a call back if her symptoms weren't improving to then be placed with some Prednisone.     Pt is asking for Prednisone to be sent to [facility] pharmacy on file.

## 2024-06-19 ENCOUNTER — LAB (OUTPATIENT)
Dept: LAB | Facility: AMBULARY SURGERY CENTER | Age: 59
End: 2024-06-19
Payer: COMMERCIAL

## 2024-06-19 DIAGNOSIS — L40.50 PSORIATIC ARTHRITIS (HCC): ICD-10-CM

## 2024-06-19 DIAGNOSIS — Z79.60 LONG-TERM USE OF IMMUNOSUPPRESSANT MEDICATION: ICD-10-CM

## 2024-06-19 LAB
ALBUMIN SERPL BCG-MCNC: 4.4 G/DL (ref 3.5–5)
ALP SERPL-CCNC: 65 U/L (ref 34–104)
ALT SERPL W P-5'-P-CCNC: 22 U/L (ref 7–52)
ANISOCYTOSIS BLD QL SMEAR: PRESENT
AST SERPL W P-5'-P-CCNC: 23 U/L (ref 13–39)
BASOPHILS # BLD MANUAL: 0 THOUSAND/UL (ref 0–0.1)
BASOPHILS NFR MAR MANUAL: 0 % (ref 0–1)
BILIRUB DIRECT SERPL-MCNC: 0.04 MG/DL (ref 0–0.2)
BILIRUB SERPL-MCNC: 0.28 MG/DL (ref 0.2–1)
CALCIUM SERPL-MCNC: 9.3 MG/DL (ref 8.4–10.2)
CREAT SERPL-MCNC: 1.05 MG/DL (ref 0.6–1.3)
EOSINOPHIL # BLD MANUAL: 0 THOUSAND/UL (ref 0–0.4)
EOSINOPHIL NFR BLD MANUAL: 0 % (ref 0–6)
ERYTHROCYTE [DISTWIDTH] IN BLOOD BY AUTOMATED COUNT: 15.9 % (ref 11.6–15.1)
GFR SERPL CREATININE-BSD FRML MDRD: 58 ML/MIN/1.73SQ M
HCT VFR BLD AUTO: 38.1 % (ref 34.8–46.1)
HGB BLD-MCNC: 11.9 G/DL (ref 11.5–15.4)
LYMPHOCYTES # BLD AUTO: 0.34 THOUSAND/UL (ref 0.6–4.47)
LYMPHOCYTES # BLD AUTO: 2 % (ref 14–44)
MCH RBC QN AUTO: 27 PG (ref 26.8–34.3)
MCHC RBC AUTO-ENTMCNC: 31.2 G/DL (ref 31.4–37.4)
MCV RBC AUTO: 86 FL (ref 82–98)
MONOCYTES # BLD AUTO: 0.22 THOUSAND/UL (ref 0–1.22)
MONOCYTES NFR BLD: 2 % (ref 4–12)
NEUTROPHILS # BLD MANUAL: 10.66 THOUSAND/UL (ref 1.85–7.62)
NEUTS SEG NFR BLD AUTO: 95 % (ref 43–75)
PLATELET # BLD AUTO: 492 THOUSANDS/UL (ref 149–390)
PLATELET BLD QL SMEAR: ABNORMAL
PMV BLD AUTO: 9.5 FL (ref 8.9–12.7)
POIKILOCYTOSIS BLD QL SMEAR: PRESENT
PROT SERPL-MCNC: 7.6 G/DL (ref 6.4–8.4)
RBC # BLD AUTO: 4.41 MILLION/UL (ref 3.81–5.12)
RBC MORPH BLD: PRESENT
VARIANT LYMPHS # BLD AUTO: 1 %
WBC # BLD AUTO: 11.22 THOUSAND/UL (ref 4.31–10.16)

## 2024-06-19 PROCEDURE — 80076 HEPATIC FUNCTION PANEL: CPT

## 2024-06-19 PROCEDURE — 82565 ASSAY OF CREATININE: CPT

## 2024-06-19 PROCEDURE — 85007 BL SMEAR W/DIFF WBC COUNT: CPT

## 2024-06-19 PROCEDURE — 85027 COMPLETE CBC AUTOMATED: CPT

## 2024-06-19 PROCEDURE — 36415 COLL VENOUS BLD VENIPUNCTURE: CPT

## 2024-06-20 NOTE — RESULT ENCOUNTER NOTE
Persistent mild thrombocytosis likely related to inflammation    Neutrophilia likely related to steroids    Mild absolute lymphocytosis - unclear etiology, will continue to monitor

## 2024-07-24 ENCOUNTER — TELEPHONE (OUTPATIENT)
Dept: GASTROENTEROLOGY | Facility: AMBULARY SURGERY CENTER | Age: 59
End: 2024-07-24

## 2024-07-24 NOTE — TELEPHONE ENCOUNTER
Reason for call:   [x] Prior Auth  [] Other:     Caller:  [x] Patient  [] Pharmacy  Name:   Address:   Callback Number:     Medication:   Pancrelipase, Lip-Prot-Amyl, (Zenpep)  3 tablets daily with meals 3 times daily       Ordering Provider:   [] PCP/Provider -   [x] Speciality/Provider - GI     Has the patient tried other medications and failed? If failed, which medications did they fail?    [] No   [x] Yes -     Is the patient's insurance updated in EPIC?   [x] Yes   [] No     Is a copy of the patient's insurance scanned in EPIC?   [x] Yes   [] No

## 2024-07-25 NOTE — TELEPHONE ENCOUNTER
PA for Pancrelipase, Lip-Prot-Amyl, (Zenpep) [id number] units     Submitted via    []CMM-KEY   [x][facility]-Case ID #   [id number]    Payer: [employer]   Phone: [phone]   Fax: [phone]     []Faxed to plan   []Other website   []Phone call Case ID #     Office notes sent, clinical questions answered. Awaiting determination    Turnaround time for your insurance to make a decision on your Prior Authorization can take 7-21 business days.

## 2024-08-05 ENCOUNTER — HOSPITAL ENCOUNTER (OUTPATIENT)
Dept: MAMMOGRAPHY | Facility: CLINIC | Age: 59
Discharge: HOME | End: 2024-08-05
Payer: COMMERCIAL

## 2024-08-05 ENCOUNTER — LAB (OUTPATIENT)
Dept: LAB | Facility: MEDICAL CENTER | Age: 59
End: 2024-08-05
Payer: COMMERCIAL

## 2024-08-05 VITALS — WEIGHT: 187 LBS | HEIGHT: 66 IN | BODY MASS INDEX: 30.05 KG/M2

## 2024-08-05 DIAGNOSIS — Z79.60 LONG-TERM USE OF IMMUNOSUPPRESSANT MEDICATION: ICD-10-CM

## 2024-08-05 DIAGNOSIS — Z12.31 ENCOUNTER FOR SCREENING MAMMOGRAM FOR MALIGNANT NEOPLASM OF BREAST: ICD-10-CM

## 2024-08-05 DIAGNOSIS — L40.50 PSORIATIC ARTHRITIS (HCC): ICD-10-CM

## 2024-08-05 LAB
ALBUMIN SERPL BCG-MCNC: 4.2 G/DL (ref 3.5–5)
ALP SERPL-CCNC: 72 U/L (ref 34–104)
ALT SERPL W P-5'-P-CCNC: 22 U/L (ref 7–52)
AST SERPL W P-5'-P-CCNC: 23 U/L (ref 13–39)
BASOPHILS # BLD AUTO: 0.07 THOUSANDS/ÂΜL (ref 0–0.1)
BASOPHILS NFR BLD AUTO: 1 % (ref 0–1)
BILIRUB DIRECT SERPL-MCNC: 0.06 MG/DL (ref 0–0.2)
BILIRUB SERPL-MCNC: 0.34 MG/DL (ref 0.2–1)
CALCIUM SERPL-MCNC: 9.6 MG/DL (ref 8.4–10.2)
CREAT SERPL-MCNC: 0.91 MG/DL (ref 0.6–1.3)
EOSINOPHIL # BLD AUTO: 0.65 THOUSAND/ÂΜL (ref 0–0.61)
EOSINOPHIL NFR BLD AUTO: 8 % (ref 0–6)
ERYTHROCYTE [DISTWIDTH] IN BLOOD BY AUTOMATED COUNT: 16.5 % (ref 11.6–15.1)
GFR SERPL CREATININE-BSD FRML MDRD: 69 ML/MIN/1.73SQ M
HCT VFR BLD AUTO: 37.6 % (ref 34.8–46.1)
HGB BLD-MCNC: 11.6 G/DL (ref 11.5–15.4)
IMM GRANULOCYTES # BLD AUTO: 0.02 THOUSAND/UL (ref 0–0.2)
IMM GRANULOCYTES NFR BLD AUTO: 0 % (ref 0–2)
LYMPHOCYTES # BLD AUTO: 1.57 THOUSANDS/ÂΜL (ref 0.6–4.47)
LYMPHOCYTES NFR BLD AUTO: 19 % (ref 14–44)
MCH RBC QN AUTO: 25.9 PG (ref 26.8–34.3)
MCHC RBC AUTO-ENTMCNC: 30.9 G/DL (ref 31.4–37.4)
MCV RBC AUTO: 84 FL (ref 82–98)
MONOCYTES # BLD AUTO: 1 THOUSAND/ÂΜL (ref 0.17–1.22)
MONOCYTES NFR BLD AUTO: 12 % (ref 4–12)
NEUTROPHILS # BLD AUTO: 5.18 THOUSANDS/ÂΜL (ref 1.85–7.62)
NEUTS SEG NFR BLD AUTO: 60 % (ref 43–75)
NRBC BLD AUTO-RTO: 0 /100 WBCS
PLATELET # BLD AUTO: 467 THOUSANDS/UL (ref 149–390)
PMV BLD AUTO: 9.8 FL (ref 8.9–12.7)
PROT SERPL-MCNC: 7.3 G/DL (ref 6.4–8.4)
RBC # BLD AUTO: 4.48 MILLION/UL (ref 3.81–5.12)
WBC # BLD AUTO: 8.49 THOUSAND/UL (ref 4.31–10.16)

## 2024-08-05 PROCEDURE — 82565 ASSAY OF CREATININE: CPT

## 2024-08-05 PROCEDURE — 77067 SCR MAMMO BI INCL CAD: CPT

## 2024-08-05 PROCEDURE — 85025 COMPLETE CBC W/AUTO DIFF WBC: CPT

## 2024-08-05 PROCEDURE — 36415 COLL VENOUS BLD VENIPUNCTURE: CPT

## 2024-08-05 PROCEDURE — 80076 HEPATIC FUNCTION PANEL: CPT

## 2024-08-05 PROCEDURE — 77063 BREAST TOMOSYNTHESIS BI: CPT

## 2024-08-06 ENCOUNTER — TELEPHONE (OUTPATIENT)
Age: 59
End: 2024-08-06

## 2024-08-06 DIAGNOSIS — L40.50 PSORIATIC ARTHRITIS (HCC): Primary | ICD-10-CM

## 2024-08-06 NOTE — TELEPHONE ENCOUNTER
Patient's  calling stating his wife is having increasing pain in her joints, asking if she can be prescribed prednisone until her appt. Please advise.

## 2024-08-11 RX ORDER — PREDNISONE 5 MG/1
TABLET ORAL
Qty: 40 TABLET | Refills: 0 | Status: SHIPPED | OUTPATIENT
Start: 2024-08-11 | End: 2024-08-26

## 2024-08-29 ENCOUNTER — OFFICE VISIT (OUTPATIENT)
Dept: RHEUMATOLOGY | Facility: CLINIC | Age: 59
End: 2024-08-29
Payer: COMMERCIAL

## 2024-08-29 ENCOUNTER — LAB (OUTPATIENT)
Dept: LAB | Facility: AMBULARY SURGERY CENTER | Age: 59
End: 2024-08-29
Payer: COMMERCIAL

## 2024-08-29 ENCOUNTER — TELEPHONE (OUTPATIENT)
Dept: RHEUMATOLOGY | Facility: CLINIC | Age: 59
End: 2024-08-29

## 2024-08-29 VITALS
SYSTOLIC BLOOD PRESSURE: 140 MMHG | BODY MASS INDEX: 29.89 KG/M2 | WEIGHT: 186 LBS | DIASTOLIC BLOOD PRESSURE: 78 MMHG | HEIGHT: 66 IN

## 2024-08-29 DIAGNOSIS — Z79.60 LONG-TERM USE OF IMMUNOSUPPRESSANT MEDICATION: ICD-10-CM

## 2024-08-29 DIAGNOSIS — L40.50 PSORIATIC ARTHRITIS (HCC): Primary | ICD-10-CM

## 2024-08-29 DIAGNOSIS — L40.50 PSORIATIC ARTHRITIS (HCC): ICD-10-CM

## 2024-08-29 LAB
ALBUMIN SERPL BCG-MCNC: 4.2 G/DL (ref 3.5–5)
ALP SERPL-CCNC: 65 U/L (ref 34–104)
ALT SERPL W P-5'-P-CCNC: 21 U/L (ref 7–52)
AST SERPL W P-5'-P-CCNC: 20 U/L (ref 13–39)
BASOPHILS # BLD AUTO: 0.06 THOUSANDS/ÂΜL (ref 0–0.1)
BASOPHILS NFR BLD AUTO: 1 % (ref 0–1)
BILIRUB DIRECT SERPL-MCNC: 0.07 MG/DL (ref 0–0.2)
BILIRUB SERPL-MCNC: 0.42 MG/DL (ref 0.2–1)
CALCIUM SERPL-MCNC: 9.1 MG/DL (ref 8.4–10.2)
CREAT SERPL-MCNC: 0.97 MG/DL (ref 0.6–1.3)
EOSINOPHIL # BLD AUTO: 0.51 THOUSAND/ÂΜL (ref 0–0.61)
EOSINOPHIL NFR BLD AUTO: 5 % (ref 0–6)
ERYTHROCYTE [DISTWIDTH] IN BLOOD BY AUTOMATED COUNT: 17.4 % (ref 11.6–15.1)
GFR SERPL CREATININE-BSD FRML MDRD: 64 ML/MIN/1.73SQ M
HCT VFR BLD AUTO: 35 % (ref 34.8–46.1)
HGB BLD-MCNC: 10.9 G/DL (ref 11.5–15.4)
IMM GRANULOCYTES # BLD AUTO: 0.05 THOUSAND/UL (ref 0–0.2)
IMM GRANULOCYTES NFR BLD AUTO: 1 % (ref 0–2)
LYMPHOCYTES # BLD AUTO: 1.83 THOUSANDS/ÂΜL (ref 0.6–4.47)
LYMPHOCYTES NFR BLD AUTO: 19 % (ref 14–44)
MCH RBC QN AUTO: 26.1 PG (ref 26.8–34.3)
MCHC RBC AUTO-ENTMCNC: 31.1 G/DL (ref 31.4–37.4)
MCV RBC AUTO: 84 FL (ref 82–98)
MONOCYTES # BLD AUTO: 1.12 THOUSAND/ÂΜL (ref 0.17–1.22)
MONOCYTES NFR BLD AUTO: 12 % (ref 4–12)
NEUTROPHILS # BLD AUTO: 6.14 THOUSANDS/ÂΜL (ref 1.85–7.62)
NEUTS SEG NFR BLD AUTO: 62 % (ref 43–75)
NRBC BLD AUTO-RTO: 0 /100 WBCS
PLATELET # BLD AUTO: 577 THOUSANDS/UL (ref 149–390)
PMV BLD AUTO: 9.4 FL (ref 8.9–12.7)
PROT SERPL-MCNC: 7 G/DL (ref 6.4–8.4)
RBC # BLD AUTO: 4.18 MILLION/UL (ref 3.81–5.12)
WBC # BLD AUTO: 9.71 THOUSAND/UL (ref 4.31–10.16)

## 2024-08-29 PROCEDURE — 86480 TB TEST CELL IMMUN MEASURE: CPT

## 2024-08-29 PROCEDURE — 85025 COMPLETE CBC W/AUTO DIFF WBC: CPT

## 2024-08-29 PROCEDURE — 86706 HEP B SURFACE ANTIBODY: CPT

## 2024-08-29 PROCEDURE — 82565 ASSAY OF CREATININE: CPT

## 2024-08-29 PROCEDURE — 36415 COLL VENOUS BLD VENIPUNCTURE: CPT

## 2024-08-29 PROCEDURE — 99215 OFFICE O/P EST HI 40 MIN: CPT | Performed by: STUDENT IN AN ORGANIZED HEALTH CARE EDUCATION/TRAINING PROGRAM

## 2024-08-29 PROCEDURE — 86704 HEP B CORE ANTIBODY TOTAL: CPT

## 2024-08-29 PROCEDURE — 86803 HEPATITIS C AB TEST: CPT

## 2024-08-29 PROCEDURE — 80076 HEPATIC FUNCTION PANEL: CPT

## 2024-08-29 PROCEDURE — 87340 HEPATITIS B SURFACE AG IA: CPT

## 2024-08-29 RX ORDER — PREDNISONE 2.5 MG/1
2.5 TABLET ORAL DAILY
Qty: 30 TABLET | Refills: 0 | Status: SHIPPED | OUTPATIENT
Start: 2024-08-29

## 2024-08-29 RX ORDER — ADALIMUMAB 40MG/0.4ML
40 KIT SUBCUTANEOUS
Qty: 2 EACH | Refills: 3 | Status: SHIPPED | OUTPATIENT
Start: 2024-08-29

## 2024-08-29 RX ORDER — PREDNISONE 5 MG/1
TABLET ORAL
Qty: 40 TABLET | Refills: 0 | Status: SHIPPED | OUTPATIENT
Start: 2024-08-29 | End: 2024-09-14

## 2024-08-29 RX ORDER — MELOXICAM 15 MG/1
TABLET ORAL
COMMUNITY
Start: 2024-08-06

## 2024-08-29 NOTE — PROGRESS NOTES
Ambulatory Visit  Name: Dayana Boyd      : 1965      MRN: [id number]  Encounter Provider: Neil Ace DO  Encounter Date: 2024   Encounter department: [facility] [place]    Assessment & Plan   1. Psoriatic arthritis (HCC)  Assessment & Plan:  Noticeable improvement with leflunomide but still very active disease, particularly enthesial pain and hand pain    Discussed Humira r:b including theoretical cancer risk, DILE risk, she is amenable to trying    Will precert for Humira, continue leflunomide     Prednisone taper for now to overlap with Humira  Orders:  -     Hepatitis B core antibody, total; Future  -     Hepatitis B surface antibody; Future  -     Hepatitis B surface antigen; Future  -     Hepatitis C antibody; Future  -     Quantiferon TB Gold Plus Assay; Future  -     predniSONE 5 mg tablet; Take 4 tablets (20 mg total) by mouth daily for 4 days, THEN 3 tablets (15 mg total) daily for 4 days, THEN 2 tablets (10 mg total) daily for 4 days, THEN 1 tablet (5 mg total) daily for 4 days.  -     predniSONE 2.5 mg tablet; Take 1 tablet (2.5 mg total) by mouth daily After finishing your taper with the 5 mg pills  2. Long-term use of immunosuppressant medication    Patient's rheumatologic disease(s) threaten long-term function if not appropriately treated.    Patient's rheumatologic medication(s) require intensive monitoring for toxicity.     History of Present Illness       Had flare of symptoms so sent in prednisone taper    SIJ and heels are hurting a lot. Elbows hurt sometimes but not all the time. Hands and fingers hurt. At the higher doses of prednisone she was feeling better    No uveitis    Permanent History: First saw me for mildly elevated ESR 49 in 2024. Had arthralgias that didn't sound overly inflammatory (no gelling, no robust response to NSAIDs) but given temporality (symptoms started soon after COVID infection) and enthesial tenderness to palpation I  "wanted to more thoroughly investigate for a peripheral SpA, particularly PsA. MSU of her feet did show active synovitis of bilateral 1st MTPs so started leflunomide.     Also felt that she had a component of FMS contributing to her all-over muscle achiness and tenderness to palpation.    Review of Systems    Objective     /78   Ht 5' 6\" (1.676 m)   Wt 84.4 kg (186 lb)   BMI 30.02 kg/m²     General: Well appearing, in no distress.   Eyes: Sclera non-icteric. EOMI  HENT: No oral ulcers. MMM.   Extremities: Warm, well perfused, no edema.   Neuro: Alert and oriented. No gross focal neurological deficits.   Skin: No rashes.  MSK exam: tenderness to palpation R lat epicondyle, R wrist, multiple MCPs and PIPs, bilateral med fem condyles, bilateral Radha's insertions, R plantar fascia insertion on calcaneus  LEI 6    Administrative Statements           "

## 2024-08-29 NOTE — TELEPHONE ENCOUNTER
Rheumatology Pre-Certification Request     Medication/Disease State Information:   Diagnosis: Psoriatic arthritis (HCC) [L40.50]   Medication: Humira 40 mg subcutaneous [id number]  Failed or Intolerant to or Contraindicated: methotrexate and sulfasalazine contraindicated, failed leflunomide alone  Screening  Renal function: fine  Hepatic function: fine  Hepatitis B: pending  Hepatitis C: pending  Tuberculosis: pending    Neil Ace DO, FLYNN  NPI: [id number]  Lic: [id number]

## 2024-08-29 NOTE — TELEPHONE ENCOUNTER
PA for Humira SUBMITTED     via    []M-KEY:    [x]Juan M-Case ID # [id number]   []Faxed to plan   []Other website    []Phone call Case ID #      Office notes sent, clinical questions answered. Awaiting determination    Turnaround time for your insurance to make a decision on your Prior Authorization can take 7-21 business days.

## 2024-08-29 NOTE — PATIENT INSTRUCTIONS
I will submit authorization for Humira    Get blood work for hepatitis and tuberculosis so that insurance will approve the Humira    Get blood work (GFR, creatinine, calcium, LFTs, CBC) every 3 months while on leflunomide.    While on your prescribed rheumatologic medication(s) leflunomide and Humira: you must STOP the medication if you fall ill (ex: a viral infection, bacterial infection) and not restart it until your illness is resolved and/or you are finished with any antibiotics/antivirals/antifungals that are prescribed for you, whichever happens last.    While on the medication, if you are to get a vaccine, you have to STOP the medication beforehand, and not restart it until at least 2 weeks after your vaccination. See below for how long to stop your medication according to how often you take it.    Frequency When to stop When to restart   More then once weekly (ex: every day or every other day) One week prior to vaccination Two weeks after vaccination   Once weekly One week prior to vaccination Two weeks after vaccination   Less than once weekly (ex: once every month) One dosing cycle prior to vaccination (ex: for a monthly drug, do not take for one month prior to your vaccination. For an every two week drug, do not take for two weeks prior to your vaccination.) Two weeks after vaccination

## 2024-08-29 NOTE — ASSESSMENT & PLAN NOTE
Noticeable improvement with leflunomide but still very active disease, particularly enthesial pain and hand pain    Discussed Humira r:b including theoretical cancer risk, DILE risk, she is amenable to trying    Will precert for Humira, continue leflunomide     Prednisone taper for now to overlap with Humira

## 2024-08-30 LAB
GAMMA INTERFERON BACKGROUND BLD IA-ACNC: 0.04 IU/ML
HBV CORE AB SER QL: NORMAL
HBV SURFACE AB SER-ACNC: 326 MIU/ML
HBV SURFACE AG SER QL: NORMAL
HCV AB SER QL: NORMAL
M TB IFN-G BLD-IMP: NEGATIVE
M TB IFN-G CD4+ BCKGRND COR BLD-ACNC: 0.01 IU/ML
M TB IFN-G CD4+ BCKGRND COR BLD-ACNC: 0.02 IU/ML
MITOGEN IGNF BCKGRD COR BLD-ACNC: 9.96 IU/ML

## 2024-08-30 NOTE — TELEPHONE ENCOUNTER
Received phone call from pt's . [facility] notified them it will need to be filled through [facility]. Also [facility] sent them PA approval letter.

## 2024-08-30 NOTE — TELEPHONE ENCOUNTER
PA for Humira APPROVED     Date(s) approved August 29, 2024 to August 29, 2025     Case #     Patient advised by          []MyChart Message  [x]Phone call   []LMOM  []L/M to call office as no active Communication consent on file  []Unable to leave detailed message as VM not approved on Communication consent       Pharmacy advised by  Please send script to [facility]  []Fax  []Phone call    Approval letter scanned into Media Yes

## 2024-09-03 RX ORDER — ADALIMUMAB 40MG/0.4ML
40 KIT SUBCUTANEOUS
Qty: 2 EACH | Refills: 3 | Status: SHIPPED | OUTPATIENT
Start: 2024-09-03

## 2024-10-08 ENCOUNTER — NURSE TRIAGE (OUTPATIENT)
Age: 59
End: 2024-10-08

## 2024-10-08 DIAGNOSIS — L40.50 PSORIATIC ARTHRITIS (HCC): Primary | ICD-10-CM

## 2024-10-08 NOTE — TELEPHONE ENCOUNTER
"Patient call:  Pt stated provider: Dr. Ace    Actionable item: Provider review and recommendatiohn    What is the reason for the call/chief complaint?    Onset- 2-3 days ago:  Reports 8 out of 10 pain and swelling to left hand/thumb/wrist, toes, ankles, calves, and lower back after pumpkin picking.     Requesting another round of Prednisone as this is what helped her last time.     Dispo: No sooner appointments available. Routing to provider for review and medication management. Continue with prescription regime.   [facility]  Agrees with plan.   All questions answered.     Reason for Disposition   SEVERE pain (e.g., excruciating, unable to use hand at all)    Answer Assessment - Initial Assessment Questions  1. ONSET: \"When did the pain start?\"      2-3 days ago  2. LOCATION: \"Where is the pain located?\"      Mainly left hand  3. PAIN: \"How bad is the pain?\" (Scale 1-10; or mild, moderate, severe)    - MILD (1-3): doesn't interfere with normal activities    - MODERATE (4-7): interferes with normal activities (e.g., work or school) or awakens from sleep    - SEVERE (8-10): excruciating pain, unable to use hand at all      8  4. WORK OR EXERCISE: \"Has there been any recent work or exercise that involved this part of the body?\"      Pumpkin picking  5. CAUSE: \"What do you think is causing the pain?\"      Flare up   6. AGGRAVATING FACTORS: \"What makes the pain worse?\" (e.g., using computer)      using  7. OTHER SYMPTOMS: \"Do you have any other symptoms?\" (e.g., neck pain, swelling, rash, numbness, fever)      Swelling in most of the joints    Protocols used: Hand and Wrist Pain-ADULT-[place]    "

## 2024-10-10 RX ORDER — PREDNISONE 5 MG/1
TABLET ORAL
Qty: 40 TABLET | Refills: 0 | Status: SHIPPED | OUTPATIENT
Start: 2024-10-10 | End: 2024-10-26

## 2024-10-15 ENCOUNTER — HOSPITAL ENCOUNTER (OUTPATIENT)
Dept: RADIOLOGY | Facility: HOSPITAL | Age: 59
Discharge: HOME | End: 2024-10-15
Attending: INTERNAL MEDICINE
Payer: COMMERCIAL

## 2024-10-15 DIAGNOSIS — K86.81 EXOCRINE PANCREATIC INSUFFICIENCY: ICD-10-CM

## 2024-10-15 PROCEDURE — G1004 CDSM NDSC: HCPCS

## 2024-10-15 PROCEDURE — 74183 MRI ABD W/O CNTR FLWD CNTR: CPT

## 2024-10-15 PROCEDURE — A9585 GADOBUTROL INJECTION: HCPCS | Performed by: INTERNAL MEDICINE

## 2024-10-15 RX ORDER — GADOBUTROL 604.72 MG/ML
8 INJECTION INTRAVENOUS
Status: COMPLETED | OUTPATIENT
Start: 2024-10-15 | End: 2024-10-15

## 2024-10-15 RX ADMIN — GADOBUTROL 8 ML: 604.72 INJECTION INTRAVENOUS at 07:36

## 2024-10-18 ENCOUNTER — OFFICE VISIT (OUTPATIENT)
Dept: GASTROENTEROLOGY | Facility: AMBULARY SURGERY CENTER | Age: 59
End: 2024-10-18
Payer: COMMERCIAL

## 2024-10-18 VITALS
WEIGHT: 189.6 LBS | BODY MASS INDEX: 30.47 KG/M2 | HEART RATE: 106 BPM | HEIGHT: 66 IN | OXYGEN SATURATION: 98 % | DIASTOLIC BLOOD PRESSURE: 82 MMHG | SYSTOLIC BLOOD PRESSURE: 156 MMHG

## 2024-10-18 DIAGNOSIS — R13.10 DYSPHAGIA, UNSPECIFIED TYPE: ICD-10-CM

## 2024-10-18 DIAGNOSIS — K21.9 GASTROESOPHAGEAL REFLUX DISEASE, UNSPECIFIED WHETHER ESOPHAGITIS PRESENT: ICD-10-CM

## 2024-10-18 DIAGNOSIS — K86.81 EXOCRINE PANCREATIC INSUFFICIENCY: ICD-10-CM

## 2024-10-18 DIAGNOSIS — R19.7 DIARRHEA, UNSPECIFIED TYPE: Primary | ICD-10-CM

## 2024-10-18 PROCEDURE — 99214 OFFICE O/P EST MOD 30 MIN: CPT | Performed by: INTERNAL MEDICINE

## 2024-10-18 RX ORDER — ADALIMUMAB-RYVK 40MG/0.4ML
KIT SUBCUTANEOUS
COMMUNITY
Start: 2024-10-07

## 2024-10-18 RX ORDER — OMEPRAZOLE 40 MG/1
40 CAPSULE, DELAYED RELEASE ORAL
Qty: 60 CAPSULE | Refills: 3 | Status: SHIPPED | OUTPATIENT
Start: 2024-10-18 | End: 2024-11-17

## 2024-10-18 NOTE — PROGRESS NOTES
Ambulatory Visit  Name: Dayana Boyd      : 1965      MRN: [id number]  Encounter Provider: Mode Gabmle MD  Encounter Date: 10/18/2024   Encounter department: [facility] [place]    Assessment & Plan  Diarrhea, unspecified type  -See below.  Orders:    Small intestinal bacterial overgrowth    Gastroesophageal reflux disease, unspecified whether esophagitis present  Reports the symptoms of GERD have recently been worsening-despite taking pantoprazole 40 mg twice daily.  -She has been on prednisone taper and has taken Mobic in the past as well-suspect likely has some gastritis as a result of this.  -Would recommend switching to omeprazole 40 mg twice daily instead.  -Hemoglobin most recently was 10.9 however no reports of melena or hematochezia.  Can repeat CBC.  -Avoid NSAIDs.      Orders:    omeprazole (PriLOSEC) 40 MG capsule; Take 1 capsule (40 mg total) by mouth 2 (two) times a day before meals    Exocrine pancreatic insufficiency  -Has been diagnosed with exocrine pancreatic insufficiency and EP GI for which she underwent endoscopic ultrasound in  showing grossly normal pancreas.  She was prescribed Questran as it was thought that there may have been a component of bile acid diarrhea however this resulted in constipation and abdominal pain.  She was recommended Bentyl on previous visit which patient reports that she did not take.  -She is currently on Zenpep 60,000 units capsules 3 times a day with meals but still having 4-5 loose bowel movements on daily basis.  -Can check for SIBO given multiple abdominal surgeries.  -Again recommended to avoid gas producing foods and consider low FODMAP diet.         Dysphagia, unspecified type  -Reports that she had improvement with previous dilation however is starting to notice some dysphagia with pills again, reports that this is mostly manageable at this time and would like to hold off on repeating EGD with dilation.  -She  "was noted to have anterior cervical esophageal web and prominent cricopharyngeal bar on a barium swallow previously prior to EGD with dilation.     Labs done most recently were  notable for normal liver enzymes, hemoglobin was 10.9, platelets 577.    History of Present Illness     Dayana Boyd is a 59 y.o. female with history of hypertension, hyperlipidemia, multiple abdominal surgeries, including laparotomy for endometriosis, hysterectomy, oophorectomy, lysis of adhesions, appendectomy, cholecystectomy, presents for follow-up.  She has been diagnosed with exocrine pancreatic insufficiency of unclear etiology and has previously undergone EUS in 2022 which showed grossly normal-appearing pancreas.  MRI was recommended for 1 year.  She actually was supposed to have an MRI earlier this year however this is only done 2 days ago.  Results are not available yet.  She is currently on Zenpep 60,000 units 3 capsules 3 times a day.  Patient reports having 4-5 bowel moods in daily basis.  Patient reports that Questran that was prescribed in the past resulted in decreased bowel movements and abdominal pain therefore does not want to take this.  She was also prescribed Bentyl on previous visit however patient reports that she did not take this.      Review of Systems   Constitutional:  Negative for fever.   Gastrointestinal:  Positive for abdominal pain, diarrhea, nausea and vomiting. Negative for constipation.   Genitourinary:  Negative for dysuria, frequency and hematuria.   Musculoskeletal:  Positive for arthralgias and myalgias.   Neurological:  Positive for headaches.           Objective     /82 (BP Location: Left arm, Patient Position: Sitting, Cuff Size: Standard)   Pulse (!) 106   Ht 5' 6\" (1.676 m)   Wt 86 kg (189 lb 9.6 oz)   SpO2 98%   BMI 30.60 kg/m²     Physical Exam  Vitals and nursing note reviewed.   Constitutional:       General: She is not in acute distress.     Appearance: She is well-developed. "   HENT:      Head: Normocephalic and atraumatic.   Eyes:      General: No scleral icterus.     Conjunctiva/sclera: Conjunctivae normal.   Cardiovascular:      Rate and Rhythm: Normal rate and regular rhythm.      Heart sounds: No murmur heard.  Pulmonary:      Effort: Pulmonary effort is normal. No respiratory distress.      Breath sounds: Normal breath sounds.   Abdominal:      Palpations: Abdomen is soft.      Tenderness: There is no abdominal tenderness.   Musculoskeletal:         General: No swelling.      Cervical back: Neck supple.   Skin:     General: Skin is warm and dry.   Neurological:      Mental Status: She is alert.   Psychiatric:         Mood and Affect: Mood normal.         Answers submitted by the patient for this visit:  Abdominal Pain Questionnaire (Submitted on 10/13/2024)  Chief Complaint: Abdominal pain  Chronicity: chronic  Onset: more than 1 year ago  Onset quality: gradual  Frequency: daily  Episode duration: 2 Hours  Progression since onset: gradually worsening  Pain location: suprapubic region  Pain - numeric: 8/10  Pain quality: cramping, sharp, tearing  Radiates to: LUQ, RLQ, suprapubic region  anorexia: No  belching: No  flatus: Yes  hematochezia: Yes  melena: No  weight loss: No  Aggravated by: bowel movement  Relieved by: bowel movements, recumbency, vomiting

## 2024-10-18 NOTE — ASSESSMENT & PLAN NOTE
Reports the symptoms of GERD have recently been worsening-despite taking pantoprazole 40 mg twice daily.  -She has been on prednisone taper and has taken Mobic in the past as well-suspect likely has some gastritis as a result of this.  -Would recommend switching to omeprazole 40 mg twice daily instead.  -Hemoglobin most recently was 10.9 however no reports of melena or hematochezia.  Can repeat CBC.  -Avoid NSAIDs.      Orders:    omeprazole (PriLOSEC) 40 MG capsule; Take 1 capsule (40 mg total) by mouth 2 (two) times a day before meals

## 2024-10-18 NOTE — ASSESSMENT & PLAN NOTE
-Has been diagnosed with exocrine pancreatic insufficiency and EP GI for which she underwent endoscopic ultrasound in 2022 showing grossly normal pancreas.  She was prescribed Questran as it was thought that there may have been a component of bile acid diarrhea however this resulted in constipation and abdominal pain.  She was recommended Bentyl on previous visit which patient reports that she did not take.  -She is currently on Zenpep 60,000 units capsules 3 times a day with meals but still having 4-5 loose bowel movements on daily basis.  -Can check for SIBO given multiple abdominal surgeries.  -Again recommended to avoid gas producing foods and consider low FODMAP diet.

## 2024-10-18 NOTE — ASSESSMENT & PLAN NOTE
-Reports that she had improvement with previous dilation however is starting to notice some dysphagia with pills again, reports that this is mostly manageable at this time and would like to hold off on repeating EGD with dilation.  -She was noted to have anterior cervical esophageal web and prominent cricopharyngeal bar on a barium swallow previously prior to EGD with dilation.     Labs done most recently were  notable for normal liver enzymes, hemoglobin was 10.9, platelets 577.

## 2024-10-22 ENCOUNTER — TELEPHONE (OUTPATIENT)
Dept: GASTROENTEROLOGY | Facility: AMBULARY SURGERY CENTER | Age: 59
End: 2024-10-22

## 2024-10-22 NOTE — TELEPHONE ENCOUNTER
----- Message from Mode Gamble MD sent at 10/22/2024  6:41 AM EDT -----  Good morning,  Your MRI/MRCP appears completely unremarkable.  No evidence of any pancreatic cyst and the pancreas appears normal.  Thank you  Dr. Gamble

## 2024-10-22 NOTE — TELEPHONE ENCOUNTER
Attempted to call patient with MRI results, however, I received voicemail. Advised to return call to office to discuss.    Please, relay results if patient returns call. Thank you!

## 2024-11-17 DIAGNOSIS — L40.50 PSORIATIC ARTHRITIS (HCC): ICD-10-CM

## 2024-11-18 RX ORDER — LEFLUNOMIDE 20 MG/1
20 TABLET ORAL DAILY
Qty: 90 TABLET | Refills: 3 | Status: SHIPPED | OUTPATIENT
Start: 2024-11-18

## 2024-12-02 ENCOUNTER — OFFICE VISIT (OUTPATIENT)
Dept: RHEUMATOLOGY | Facility: CLINIC | Age: 59
End: 2024-12-02
Payer: COMMERCIAL

## 2024-12-02 ENCOUNTER — APPOINTMENT (OUTPATIENT)
Dept: LAB | Facility: AMBULARY SURGERY CENTER | Age: 59
End: 2024-12-02
Payer: COMMERCIAL

## 2024-12-02 VITALS
SYSTOLIC BLOOD PRESSURE: 128 MMHG | HEIGHT: 66 IN | DIASTOLIC BLOOD PRESSURE: 88 MMHG | TEMPERATURE: 98.5 F | WEIGHT: 196.5 LBS | HEART RATE: 111 BPM | BODY MASS INDEX: 31.58 KG/M2 | OXYGEN SATURATION: 98 %

## 2024-12-02 DIAGNOSIS — Z79.60 LONG-TERM USE OF IMMUNOSUPPRESSANT MEDICATION: ICD-10-CM

## 2024-12-02 DIAGNOSIS — L40.50 PSORIATIC ARTHRITIS (HCC): Primary | ICD-10-CM

## 2024-12-02 DIAGNOSIS — K21.9 GASTROESOPHAGEAL REFLUX DISEASE, UNSPECIFIED WHETHER ESOPHAGITIS PRESENT: ICD-10-CM

## 2024-12-02 DIAGNOSIS — L40.50 PSORIATIC ARTHRITIS (HCC): ICD-10-CM

## 2024-12-02 LAB
ALBUMIN SERPL BCG-MCNC: 4.2 G/DL (ref 3.5–5)
ALP SERPL-CCNC: 59 U/L (ref 34–104)
ALT SERPL W P-5'-P-CCNC: 29 U/L (ref 7–52)
AST SERPL W P-5'-P-CCNC: 31 U/L (ref 13–39)
BASOPHILS # BLD AUTO: 0.05 THOUSANDS/ΜL (ref 0–0.1)
BASOPHILS NFR BLD AUTO: 1 % (ref 0–1)
BILIRUB DIRECT SERPL-MCNC: 0.07 MG/DL (ref 0–0.2)
BILIRUB SERPL-MCNC: 0.29 MG/DL (ref 0.2–1)
CALCIUM SERPL-MCNC: 9 MG/DL (ref 8.4–10.2)
CREAT SERPL-MCNC: 0.89 MG/DL (ref 0.6–1.3)
EOSINOPHIL # BLD AUTO: 0.17 THOUSAND/ΜL (ref 0–0.61)
EOSINOPHIL NFR BLD AUTO: 3 % (ref 0–6)
ERYTHROCYTE [DISTWIDTH] IN BLOOD BY AUTOMATED COUNT: 15.7 % (ref 11.6–15.1)
GFR SERPL CREATININE-BSD FRML MDRD: 71 ML/MIN/1.73SQ M
HCT VFR BLD AUTO: 36.2 % (ref 34.8–46.1)
HGB BLD-MCNC: 11.3 G/DL (ref 11.5–15.4)
IMM GRANULOCYTES # BLD AUTO: 0.02 THOUSAND/UL (ref 0–0.2)
IMM GRANULOCYTES NFR BLD AUTO: 0 % (ref 0–2)
LYMPHOCYTES # BLD AUTO: 1.37 THOUSANDS/ΜL (ref 0.6–4.47)
LYMPHOCYTES NFR BLD AUTO: 28 % (ref 14–44)
MCH RBC QN AUTO: 26.2 PG (ref 26.8–34.3)
MCHC RBC AUTO-ENTMCNC: 31.2 G/DL (ref 31.4–37.4)
MCV RBC AUTO: 84 FL (ref 82–98)
MONOCYTES # BLD AUTO: 0.81 THOUSAND/ΜL (ref 0.17–1.22)
MONOCYTES NFR BLD AUTO: 16 % (ref 4–12)
NEUTROPHILS # BLD AUTO: 2.57 THOUSANDS/ΜL (ref 1.85–7.62)
NEUTS SEG NFR BLD AUTO: 52 % (ref 43–75)
NRBC BLD AUTO-RTO: 0 /100 WBCS
PLATELET # BLD AUTO: 398 THOUSANDS/UL (ref 149–390)
PMV BLD AUTO: 9.7 FL (ref 8.9–12.7)
PROT SERPL-MCNC: 7.5 G/DL (ref 6.4–8.4)
RBC # BLD AUTO: 4.32 MILLION/UL (ref 3.81–5.12)
WBC # BLD AUTO: 4.99 THOUSAND/UL (ref 4.31–10.16)

## 2024-12-02 PROCEDURE — 80076 HEPATIC FUNCTION PANEL: CPT

## 2024-12-02 PROCEDURE — 85025 COMPLETE CBC W/AUTO DIFF WBC: CPT

## 2024-12-02 PROCEDURE — 82565 ASSAY OF CREATININE: CPT

## 2024-12-02 PROCEDURE — 99215 OFFICE O/P EST HI 40 MIN: CPT | Performed by: STUDENT IN AN ORGANIZED HEALTH CARE EDUCATION/TRAINING PROGRAM

## 2024-12-02 PROCEDURE — 36415 COLL VENOUS BLD VENIPUNCTURE: CPT

## 2024-12-02 RX ORDER — PANTOPRAZOLE SODIUM 40 MG/1
TABLET, DELAYED RELEASE ORAL
COMMUNITY
Start: 2024-11-11

## 2024-12-02 NOTE — PROGRESS NOTES
Name: Dayana Boyd      : 1965      MRN: [id number]  Encounter Provider: Neil Ace DO  Encounter Date: 2024   Encounter department: [facility] [place]  :  Assessment & Plan  Psoriatic arthritis (HCC)  Significant improvement in symptoms with adalimumab BUT has developed pus-filled rashes for the past month and a half on the face. Image of ear rash looks possibly PsO but could be adalimumab reaction    Next due this Thursday, instructed her to STOP and see if rash issue resolves. If not, unlikely adalimumab-related. If so, will try switching to Enbrel as she has had good response to TNF blockade, discussed r:b enbrel and she is amenable to switching to that if necessary    Will follow up in a couple of weeks       Patient's rheumatologic disease(s) threaten long-term function if not appropriately managed.    Patient's rheumatologic medication(s) require intensive monitoring for toxicity. Possible adalimumab side effect as above      History of Present Illness     Asthma getting worse    Has noticed significant improvement in joints with adalimumab    Has developed red bumps with pus, gets them in the ears, under the nose, around the lip. Just since starting the adalimumab, started about a month and a half ago. Nothing in the mouth itself. Cortisone cream didn't help.    Permanent History: First saw me for mildly elevated ESR 49 in 2024. Had arthralgias that didn't sound overly inflammatory (no gelling, no robust response to NSAIDs) but given temporality (symptoms started soon after COVID infection) and enthesial tenderness to palpation I wanted to more thoroughly investigate for a peripheral SpA, particularly PsA. MSU of her feet did show active synovitis of bilateral 1st MTPs so started leflunomide. Had noticeable improvement but continued with entheseal pain and hand pain so started Humira.     Also felt that she had a component of FMS contributing to her all-over  "muscle achiness and tenderness to palpation.     Objective   /88 (BP Location: Right arm)   Pulse (!) 111   Temp 98.5 °F (36.9 °C)   Ht 5' 6\" (1.676 m)   Wt 89.1 kg (196 lb 8 oz)   SpO2 98%   BMI 31.72 kg/m²     General: Well appearing, in no distress.   Eyes: Sclera non-icteric. EOMI  Extremities: Warm, well perfused, no edema.   Neuro: Alert and oriented. No gross focal neurological deficits.   Skin: No rashes.  MSK exam: tenderness to palpation bilateral shoulders, bilateral 1st MCPs, significant tenderness to palpation R knee  "

## 2024-12-02 NOTE — PATIENT INSTRUCTIONS
STOP adalimumab for now    At next visit we will discuss whether to restart adalimumab, or try for Enbrel    Dec 23 1:30 video

## 2024-12-02 NOTE — ASSESSMENT & PLAN NOTE
Significant improvement in symptoms with adalimumab BUT has developed pus-filled rashes for the past month and a half on the face. Image of ear rash looks possibly PsO but could be adalimumab reaction    Next due this Thursday, instructed her to STOP and see if rash issue resolves. If not, unlikely adalimumab-related. If so, will try switching to Enbrel as she has had good response to TNF blockade, discussed r:b enbrel and she is amenable to switching to that if necessary    Will follow up in a couple of weeks

## 2024-12-03 ENCOUNTER — RESULTS FOLLOW-UP (OUTPATIENT)
Dept: RHEUMATOLOGY | Facility: CLINIC | Age: 59
End: 2024-12-03

## 2024-12-12 ENCOUNTER — PATIENT MESSAGE (OUTPATIENT)
Dept: RHEUMATOLOGY | Facility: CLINIC | Age: 59
End: 2024-12-12

## 2024-12-12 DIAGNOSIS — L40.50 PSORIATIC ARTHRITIS (HCC): Primary | ICD-10-CM

## 2024-12-13 RX ORDER — PREDNISONE 5 MG/1
TABLET ORAL
Qty: 40 TABLET | Refills: 0 | Status: SHIPPED | OUTPATIENT
Start: 2024-12-13 | End: 2024-12-28

## 2024-12-23 ENCOUNTER — TELEPHONE (OUTPATIENT)
Age: 59
End: 2024-12-23

## 2024-12-23 NOTE — TELEPHONE ENCOUNTER
Please schedule her for Dec 26 11 AM Joanne us already called to confirm that this appointment date/time works for them

## 2024-12-23 NOTE — TELEPHONE ENCOUNTER
Patients  called in because he states that Dr. ISIDRO never connected on to the virtual visit. After calling over to the office, per Dr. ISIDRO he said he did connect to the visit but could not hear or see the patient and after waiting 15 minutes he had to hang up the call. Patients  states that Dayana can't wait until March to see the doctor because Dayana is currently off of her injections and is in a lot of pain. He is hoping we could get her in sooner. Please advise.

## 2024-12-26 ENCOUNTER — TELEMEDICINE (OUTPATIENT)
Dept: RHEUMATOLOGY | Facility: CLINIC | Age: 59
End: 2024-12-26
Payer: COMMERCIAL

## 2024-12-26 ENCOUNTER — TELEPHONE (OUTPATIENT)
Age: 59
End: 2024-12-26

## 2024-12-26 DIAGNOSIS — L40.50 PSORIATIC ARTHRITIS (HCC): Primary | ICD-10-CM

## 2024-12-26 PROCEDURE — 99215 OFFICE O/P EST HI 40 MIN: CPT | Performed by: STUDENT IN AN ORGANIZED HEALTH CARE EDUCATION/TRAINING PROGRAM

## 2024-12-26 RX ORDER — TRIAMCINOLONE ACETONIDE 0.25 MG/G
OINTMENT TOPICAL 2 TIMES DAILY PRN
Qty: 454 G | Refills: 1 | Status: SHIPPED | OUTPATIENT
Start: 2024-12-26

## 2024-12-26 NOTE — PATIENT INSTRUCTIONS
Would recommend talking to your PCP about the rash on the face, which sounds like it could be infectious    Don't use steroid cream on the possible infectious rash

## 2024-12-26 NOTE — TELEPHONE ENCOUNTER
----- Message from Neil Ace DO sent at 12/26/2024 12:00 PM EST -----  Order placed in epic  ----- Message -----  From: Tien Hernández MA  Sent: 12/26/2024  11:55 AM EST  To: Neil Ace DO      ----- Message -----  From: Merle Westfall MA  Sent: 12/26/2024  11:29 AM EST  To: [facility] [place]    Please place order in [facility]  ----- Message -----  From: Neil Ace DO  Sent: 12/26/2024  11:21 AM EST  To: #    Precert for Enbrel for psoriatic arthritis, failed Humira due to skin infection on face that resolved after stopping

## 2024-12-26 NOTE — PROGRESS NOTES
Telemedicine consent    Patient: Dayana Boyd  Provider: Neil Ace DO  Provider located at [facility]  [address] [id number]    The patient was identified by name and date of birth. Dayana Boyd was informed that this is a telemedicine visit and that the visit is being conducted through the Epic Embedded platform. She agrees to proceed..  My office door was closed. No one else was in the room.  She acknowledged consent and understanding of privacy and security of the video platform. The patient has agreed to participate and understands they can discontinue the visit at any time.    Patient is aware this is a billable service.     I spent 5 minutes with the patient during this visit.    Name: Dayana Boyd      : 1965      MRN: [id number]  Encounter Provider: Neil Ace DO  Encounter Date: 2024   Encounter department: [facility]  :  Assessment & Plan  Psoriatic arthritis (HCC)  Will try for Enbrel given good response to Humira for joints but worsening of what sounds like skin infection on face    Encouraged her to discuss the face rash with her PCP (draining pus)    Has active PsO around the ears, will submit steroid cream for that, advised her NOT to use the steroid cream on the ?infectious rash    Continue leflunomide   Orders:    triamcinolone (KENALOG) 0.025 % ointment; Apply topically 2 (two) times a day as needed (psoriasis rash on face/neck/ears)      Patient's rheumatologic disease(s) threaten long-term function if not appropriately managed.    Patient's rheumatologic medication(s) require intensive monitoring for toxicity. Does not endorse any significant side effects.    History of Present Illness     Steroid she is on helping, but since stopping Humira has had marked worsening of her joint symptoms    Currently has some PsO around the ears    Permanent  history: First saw me for mildly elevated ESR 49 in Jan 2024. Had arthralgias that didn't sound overly inflammatory (no gelling, no robust response to NSAIDs) but given temporality (symptoms started soon after COVID infection) and enthesial tenderness to palpation I wanted to more thoroughly investigate for a peripheral SpA, particularly PsA. MSU of her feet did show active synovitis of bilateral 1st MTPs so started leflunomide. Had noticeable improvement but continued with entheseal pain and hand pain so started Humira. Had significant improvement in joints with this but developed pus-filled facial rash after starting, not responsive to cortisone cream.     Also felt that she had a component of FMS contributing to her all-over muscle achiness and tenderness to palpation.     Objective   There were no vitals taken for this visit.    well-appearing, no acute distress

## 2024-12-26 NOTE — TELEPHONE ENCOUNTER
PA for Enbrel SUBMITTED to [facility]    via    []CMM-KEY:    [x]Surescripts-Case ID #  [id number]  []Availity-Auth ID #  NDC #    []Faxed to plan   []Other website    []Phone call Case ID #      [x]PA sent as URGENT    All office notes, labs and other pertaining documents and studies sent. Clinical questions answered. Awaiting determination from insurance company.     Turnaround time for your insurance to make a decision on your Prior Authorization can take 7-21 business days.

## 2024-12-26 NOTE — TELEPHONE ENCOUNTER
PA for enbrel  APPROVED     Date(s) approved 12/26/25    Case #     Patient advised by          []MyChart Message  []Phone call   [x]LMOM  []L/M to call office as no active Communication consent on file  []Unable to leave detailed message as VM not approved on Communication consent       Pharmacy advised by    [x]Fax  []Phone call    Approval letter scanned into Media Yes

## 2024-12-26 NOTE — Clinical Note
Precert for Enbrel for psoriatic arthritis, failed Humira due to skin infection on face that resolved after stopping

## 2024-12-26 NOTE — ASSESSMENT & PLAN NOTE
Will try for Enbrel given good response to Humira for joints but worsening of what sounds like skin infection on face    Encouraged her to discuss the face rash with her PCP (draining pus)    Has active PsO around the ears, will submit steroid cream for that, advised her NOT to use the steroid cream on the ?infectious rash    Continue leflunomide   Orders:    triamcinolone (KENALOG) 0.025 % ointment; Apply topically 2 (two) times a day as needed (psoriasis rash on face/neck/ears)

## 2024-12-27 NOTE — TELEPHONE ENCOUNTER
Patient called to make sure that this medication is being sent/ released  to [facility] to be filled since the PA is approved

## 2025-01-02 DIAGNOSIS — L40.50 PSORIATIC ARTHRITIS (HCC): ICD-10-CM

## 2025-01-02 NOTE — TELEPHONE ENCOUNTER
Reason for call:   [x] Refill   [] Prior Auth  [x] Other: Sent to incorrect pharmacy. Needs to be sent to [facility] pharmacy.    Office:   [] PCP/Provider -   [x] Specialty/Provider - Neil Ace     Medication: etanercept (ENBREL SURECLICK) 50 MG/ML injection     Dose/Frequency: Inject 1 mL (50 mg total) under the skin once a week     Quantity: 4 mL    Pharmacy: [facility], [address]    Does the patient have enough for 3 days?   [] Yes   [x] No - Send as HP to POD

## 2025-04-15 ENCOUNTER — APPOINTMENT (OUTPATIENT)
Dept: RADIOLOGY | Facility: MEDICAL CENTER | Age: 60
End: 2025-04-15
Payer: COMMERCIAL

## 2025-04-15 DIAGNOSIS — J45.901 ASTHMA WITH ACUTE EXACERBATION, UNSPECIFIED ASTHMA SEVERITY, UNSPECIFIED WHETHER PERSISTENT: ICD-10-CM

## 2025-04-15 PROCEDURE — 71046 X-RAY EXAM CHEST 2 VIEWS: CPT

## 2025-05-22 ENCOUNTER — OFFICE VISIT (OUTPATIENT)
Dept: RHEUMATOLOGY | Facility: CLINIC | Age: 60
End: 2025-05-22

## 2025-05-22 ENCOUNTER — TELEPHONE (OUTPATIENT)
Dept: RHEUMATOLOGY | Facility: CLINIC | Age: 60
End: 2025-05-22

## 2025-05-22 VITALS
HEART RATE: 111 BPM | BODY MASS INDEX: 30.05 KG/M2 | OXYGEN SATURATION: 96 % | WEIGHT: 187 LBS | TEMPERATURE: 97.9 F | SYSTOLIC BLOOD PRESSURE: 148 MMHG | HEIGHT: 66 IN | DIASTOLIC BLOOD PRESSURE: 86 MMHG

## 2025-05-22 DIAGNOSIS — L40.50 PSORIATIC ARTHRITIS (HCC): Primary | ICD-10-CM

## 2025-05-22 RX ORDER — PREDNISONE 5 MG/1
TABLET ORAL
Qty: 40 TABLET | Refills: 0 | Status: SHIPPED | OUTPATIENT
Start: 2025-05-22 | End: 2025-06-07

## 2025-05-22 NOTE — PROGRESS NOTES
Name: Dayana Boyd      : 1965      MRN: [id number]  Encounter Provider: Neil Ace DO  Encounter Date: 2025   Encounter department: [facility] [place]  :  Assessment & Plan  Psoriatic arthritis (HCC)  Too many respiratory infections on Enbrel    Discussed IL-17i risks:benefits, will precert    Would likely avoid Rahel due to history arterial clot, though not clear that that would truly increase CV risk from Rahel    Continue leflunomide   Orders:    predniSONE 5 mg tablet; Take 4 tablets (20 mg total) by mouth daily for 4 days, THEN 3 tablets (15 mg total) daily for 4 days, THEN 2 tablets (10 mg total) daily for 4 days, THEN 1 tablet (5 mg total) daily for 4 days.      Patient's rheumatologic disease(s) threaten long-term function if not appropriately managed.    Patient's rheumatologic medication(s) require intensive monitoring for toxicity.    History of Present Illness     Was on Enbrel in , off in Feb due to PNA and on antibiotics, restarted end of Mar, symptoms came back really badly. Felt to be asthma exacerbation from the Enbrel.    Getting really bad low back pain. Stiffness in the AM worse in feet, ankles, knees, gets better with use and worse with rest. Has been getting sharp pains in the Beulah's tendon.    Had an arterial blood clot in the leg at age 24, not on blood thinners. No heart attack/stroke history. No cancer history. No diverticulitis history, no GI surgeries.    Permanent history: First saw me for mildly elevated ESR 49 in 2024. Had arthralgias that didn't sound overly inflammatory (no gelling, no robust response to NSAIDs) but given temporality (symptoms started soon after COVID infection) and enthesial tenderness to palpation I wanted to more thoroughly investigate for a peripheral SpA, particularly PsA. MSU of her feet did show active synovitis of bilateral 1st MTPs so started leflunomide. Had noticeable improvement but continued with  "entheseal pain and hand pain so started Humira. Had significant improvement in joints with this but developed pus-filled facial rash after starting, not responsive to cortisone cream. Stopped Humira and started Enbrel.     Also felt that she had a component of FMS contributing to her all-over muscle achiness and tenderness to palpation.    Other relevant history arterial clot in leg at age 25, was attributed to birth control and was on warfarin for 6 months     Objective   /86 (BP Location: Right arm, Patient Position: Sitting, Cuff Size: Adult)   Pulse (!) 111   Temp 97.9 °F (36.6 °C) (Temporal)   Ht 5' 6\" (1.676 m)   Wt 84.8 kg (187 lb)   SpO2 96%   BMI 30.18 kg/m²      General: Well appearing, in no distress.   Eyes: Sclera non-icteric. EOMI  Extremities: Warm, well perfused, no edema.   Neuro: Alert and oriented. No gross focal neurological deficits.   Skin: No rashes.  MSK exam: R 5th PIP, bilateral med fem condyles, bilateral Chandler's, R SIJ tenderness to palpation  JERRY ENTHESITIS INDEX    LEFT RIGHT   Lateal epicondyle 0/1 0/1   Medial femoral condyle 1/1 1/1   Chandler's enthesis 1/1 1/1   TOTAL 4/6       "

## 2025-05-22 NOTE — PATIENT INSTRUCTIONS
Get blood work (creatinine, calcium, LFTs, CBC) every 3 months while on leflunomide. You are overdue for this.    While on your prescribed rheumatologic medication(s) leflunomide, secukinumab/ixekizumab (Cosentyx/Taltz): you must STOP the medication if you fall ill (ex: a viral infection, bacterial infection) and not restart it until your illness is resolved and/or you are finished with any antibiotics/antivirals/antifungals that are prescribed for you, whichever happens last.    While on the medication(s), if you are to get a vaccine, you may have to STOP the medication before and after your vaccination. See below for how long to stop your medication according to how often you take it.    LIVE ATTENUATED VACCINES (ex: MMR, rotavirus, smallpox, chickenpox, yellow fever)    Hold before vaccine Hold after vaccine   Steroids 4 weeks 4 weeks   Leflunomide 4 weeks    IL-17 inhibitors such as:  Ixekizumab  Secukinumab 1 dosing interval (ex: for an every 4 week medication, hold for 4 weeks)      COVID-19 VACCINE    Instructions   Abatacept (IV) Time vaccine so it is given 2 weeks before next scheduled abatacept dose, then receive abatacept as scheduled   Abatacept (SQ) Delay abatacept dose for 2 weeks after vaccine   Belimumab Delay belimumab dose for 2 weeks after vaccine   Cyclophosphamide (IV) Time cyclophosphamide administration so that it will occur 1 week after each vaccine dose   Hydroxychloroquine No changes to hydroxychloroquine timing or vaccine timing   IVIG No changes to IVIG timing or vaccine timing   Rituximab Time vaccine so that it is given 2 weeks before the next scheduled rituximab dose, then receive rituximab as scheduled   All others Delay rheumatologic medication dose for 2 weeks after vaccine, if disease activity allows     INFLUENZA VACCINE    Hold before vaccine Hold after vaccine   Methotrexate 1 week 2 weeks if able   Rituximab  n/a 2 weeks   All others CONTINUE, DO NOT HOLD CONTINUE, DO NOT HOLD      ALL OTHER VACCINES    Hold before vaccine Hold after vaccine   Methotrexate CONTINUE, DO NOT HOLD CONTINUE, DO NOT HOLD   Rituximab  n/a Time vaccination for when next dose is due, then give rituximab at least 2 weeks after vaccine   All others CONTINUE, DO NOT HOLD CONTINUE, DO NOT HOLD     These instructions are consistent with the 2022 recommendations set forth by the American College of Rheumatology (ACR).    Ixekizumab (Taltz)    Ixekizumab (Taltz) is a biologic medication used to treat psoriatic arthritis, plaque psoriasis, ankylosing spondylitis, and non-radiographic axial spondyloarthritis. Biologic medications are proteins designed by humans that affect the immune system. Ixekizumab blocks the inflammatory protein IL-17A. This improves joint pain and swelling from arthritis and rash in psoriatic conditions.    How To Take It  Ixekizumab is a self-administered injection that comes in 80 mg syringes. For psoriatic arthritis and ankylosing spondylitis, it starts with 160 mg (2 syringes), followed by 80 mg (1 syringe) every 4 weeks. For plaque psoriasis, it starts with 160 mg (2 syringes), followed by 80 mg (1 syringe) at weeks 2, 4, 6, 8, 10 and 12, then every 4 weeks. For non-radiographic axial spondyloarthritis, there is no starting dose; the regimen is 80 mg every 4 weeks. The medicine can be injected into the thigh or abdomen. The site of injection should be rotated so the same site is not used multiple times. Some patients will start to see improvement within a few weeks, but it may take several months to take full effect. Ixekizumab may be taken alone or with methotrexate or other non-biologic drugs. Ixekizumab should not be given in combination with another biologic drug.    Side Effects  Ixekizumab can lower the ability of your immune system to fight infections. If you develop symptoms of an infection while using this medication, you should stop it and contact your rheumatology provider. All  patients should be tested for tuberculosis and hepatitis before starting on ixekizumab. The most common side effects are infections, injection site reactions, upper respiratory infections, and lowering of white blood cells called neutropenia. Rare cases of inflammatory bowel disease (Crohn’s disease or ulcerative colitis) have been seen. Ixekizumab has not been studied in pregnancy or breastfeeding.    Tell Your Rheumatology Provider  You should contact your provider if you develop symptoms of an infection, such as a fever or cough, or if you think you are having any side effects, especially diarrhea or allergic reactions. Be sure to let your provider know if you are pregnant, planning to get pregnant, or if you are breastfeeding. If you are planning on having surgery or if you plan on getting any live vaccinations, talk to your rheumatology provider first. These include the nasal spray flu vaccine, and others such as the measles, mumps, rubella, and yellow fever vaccines.    Updated February 2024 by Shant Mace MD, and reviewed by the American College of Rheumatology Committee on Communications and Marketing.    Secukinumab (Cosentyx)    Secukinumab (Cosentyx) is a biologic medication used to treat conditions like psoriasis, psoriatic arthritis, and ankylosing spondylitis. Biologics are medicines that humans made through genetic engineering techniques and closely related to a protein that occurs naturally in the body.    How To Take It  Secukinumab can be injected under the skin (subcutaneous injection) or injected into the vein through IV (intravenous infusion). When injecting under the skin, the site of injection should be rotated so the same site is not used multiple times. For injecting into the vein through IV, it is done by special nurses that will always monitor the entire process. Some patients will start to see improvement within a few weeks, but it may take several months to take full effect.  Secukinumab may be taken alone or other medication such as with methotrexate. Secukinumab should not be given with another biologic drug.    Side Effects  The most common side effects are infections such as cold symptoms or diarrhea. If you develop symptoms of an infection while using this medication, you should stop it and contact your doctor. Rare cases of inflammatory bowel disease, such as Crohn’s disease or ulcerative colitis, have been seen. Very rarely, patients have developed allergic reactions to secukinumab. Secukinumab can lower your immune system’s ability to fight infections. All patients should be tested for tuberculosis before starting on secukinumab.    Tell Your Rheumatology Provider  If you develop signs of an infection or have any side effects, especially diarrhea, bloody bowel movements, abdominal pain, fever, or allergic reactions, you should stop taking the medication and contact your rheumatology provider. If you are pregnant or considering pregnancy, let your doctor know before starting this medication. Secukinumab has not been studied in pregnancy or breastfeeding. Be sure to talk with your rheumatology provider before receiving any vaccines or undergoing any surgeries while taking this medication.    Updated March 2024 by Jatin Beth MD, and reviewed by the American College of Rheumatology Communications and Marketing Committee.

## 2025-05-22 NOTE — ASSESSMENT & PLAN NOTE
Too many respiratory infections on Enbrel    Discussed IL-17i risks:benefits, will precert    Would likely avoid Rahel due to history arterial clot, though not clear that that would truly increase CV risk from Rahel    Continue leflunomide   Orders:    predniSONE 5 mg tablet; Take 4 tablets (20 mg total) by mouth daily for 4 days, THEN 3 tablets (15 mg total) daily for 4 days, THEN 2 tablets (10 mg total) daily for 4 days, THEN 1 tablet (5 mg total) daily for 4 days.

## 2025-05-23 NOTE — TELEPHONE ENCOUNTER
PA for Taltz 80mg SUBMITTED to [facility]     via    []CMM-KEY:    [x]Surescripts-Case ID # [id number]   []Availity-Auth ID #  NDC #    []Faxed to plan   []Other website    []Phone call Case ID #      [x]PA sent as URGENT    All office notes, labs and other pertaining documents and studies sent. Clinical questions answered. Awaiting determination from insurance company.     Turnaround time for your insurance to make a decision on your Prior Authorization can take 7-21 business days.

## 2025-05-23 NOTE — TELEPHONE ENCOUNTER
Rheumatology Pre-Certification Request     Medication/Disease State Information:   Diagnosis Psoriatic arthritis (HCC) [L40.50]  Medication: Ixekizumab subcutaneous 160 mg once, followed by 80 mg every 4 weeks   Failed or Intolerant to or Contraindicated: leflunomide (failed alone), sulfasalazine (sulfa allergy), adalimumab (severe skin infection), etanercept (recurrent pneumonia), GRACIE inhibitors (history arterial blood clot)  Screening  Renal function: fine  Hepatic function: fine  Hepatitis B: neg  Hepatitis C: neg  Tuberculosis: neg    Neil Ace DO, CCD, FACR  NPI: [id number]  Lic: [id number]

## 2025-06-24 ENCOUNTER — NURSE TRIAGE (OUTPATIENT)
Age: 60
End: 2025-06-24

## 2025-06-24 NOTE — TELEPHONE ENCOUNTER
"REASON FOR CONVERSATION: Difficulty Swallowing and Pain with Swallowing    SYMPTOMS: sharp pain at back of throat and at LES when swallowing liquids and solids - started 3-4 days ago, pain 8-9/10 when swallowing    OTHER HEALTH INFORMATION: Hx of dysphagia and needing dilation - 12/20/23; currently taking pantoprazole 40mg BID and   Pepcid 20mg qHS    PROTOCOL DISPOSITION: 24 hour provider response and schedule office visit    CARE ADVICE PROVIDED: Liquid diet, add extra dose of Pepcid around lunchtime, add OTC Gaviscon as directed, ER precautions reviewed and understanding verbalized.  Next OV: 6/30/25    PRACTICE FOLLOW-UP: Please review    Reason for Disposition   Swallowing difficulty and cause unknown  (Exception: Difficulty swallowing is a chronic symptom.)    Answer Assessment - Initial Assessment Questions  1. DESCRIPTION: \"Tell me more about this problem.\" \"Are you  having trouble swallowing liquids, solids, or both?\" \"Any trouble with swallowing saliva (spit)?\"      both  2. SEVERITY: \"How bad is the swallowing difficulty?\"  (Scale 1-10; or mild, moderate, severe)      Moderate - very painful with swallowing  3. ONSET: \"When did the swallowing problems begin?\"       3-4 days ago  4. CAUSE: \"What do you think is causing the problem?\"  (e.g., dry mouth, food or pill stuck in throat, mouth pain, sore throat, progression of disease process such as dementia or Parkinson's disease).       unsure  5. CHRONIC or RECURRENT: \"Is this a new problem for you?\"  If No, ask: \"How long have you had this problem?\" (e.g., days, weeks, months)       Yes not this bad  6. OTHER SYMPTOMS: \"Do you have any other symptoms?\" (e.g., chest pain, difficulty breathing, mouth sores, sore throat, swollen tongue, chest pain)      denies    Answer Assessment - Initial Assessment Questions  1. ONSET: \"When did the throat start hurting?\" (Hours or days ago)       3-4 days ago  2. SEVERITY: \"How bad is the sore throat?\" (Scale 1-10; mild, " "moderate or severe)      8-9/10  3. STREP EXPOSURE: \"Has there been any exposure to strep within the past week?\" If Yes, ask: \"What type of contact occurred?\"       denies  4.  VIRAL SYMPTOMS: \"Are there any symptoms of a cold, such as a runny nose, cough, hoarse voice or red eyes?\"       denies  5. FEVER: \"Do you have a fever?\" If Yes, ask: \"What is your temperature, how was it measured, and when did it start?\"      denies  7. OTHER SYMPTOMS: \"Do you have any other symptoms?\" (e.g., difficulty breathing, headache, rash)      denies    Protocols used: Swallowing Difficulty-Adult-OH, Sore Throat-Adult-OH    "

## 2025-06-24 NOTE — TELEPHONE ENCOUNTER
Called spoke to patient.  She reports new pain in the back of the throat as well as the lower esophagus when swallowing.  This happened today with solid food.  She is taking Protonix twice daily and Pepcid at bedtime.  She reports she is otherwise doing okay and feels that she is staying well-hydrated.  She feels comfortable with appointment on Monday.  I agree with Gaviscon trial which may help her symptoms more.  Agree with liquid diet however can advance to full liquid diet such as puddings, Jell-O and some cream of wheat.  Discussed avoiding dry breads and meats.  Discussed ER symptoms.  She was appreciative of call.  No further questions.

## 2025-06-30 ENCOUNTER — OFFICE VISIT (OUTPATIENT)
Dept: GASTROENTEROLOGY | Facility: MEDICAL CENTER | Age: 60
End: 2025-06-30
Payer: COMMERCIAL

## 2025-06-30 ENCOUNTER — TELEPHONE (OUTPATIENT)
Dept: GASTROENTEROLOGY | Facility: MEDICAL CENTER | Age: 60
End: 2025-06-30

## 2025-06-30 VITALS
HEART RATE: 116 BPM | TEMPERATURE: 98.3 F | SYSTOLIC BLOOD PRESSURE: 153 MMHG | DIASTOLIC BLOOD PRESSURE: 86 MMHG | WEIGHT: 187.6 LBS | BODY MASS INDEX: 30.28 KG/M2

## 2025-06-30 DIAGNOSIS — R13.10 DYSPHAGIA, UNSPECIFIED TYPE: Primary | ICD-10-CM

## 2025-06-30 DIAGNOSIS — K86.81 EXOCRINE PANCREATIC INSUFFICIENCY: ICD-10-CM

## 2025-06-30 PROCEDURE — 99214 OFFICE O/P EST MOD 30 MIN: CPT | Performed by: PHYSICIAN ASSISTANT

## 2025-06-30 RX ORDER — SODIUM CHLORIDE, SODIUM LACTATE, POTASSIUM CHLORIDE, CALCIUM CHLORIDE 600; 310; 30; 20 MG/100ML; MG/100ML; MG/100ML; MG/100ML
125 INJECTION, SOLUTION INTRAVENOUS CONTINUOUS
OUTPATIENT
Start: 2025-06-30

## 2025-06-30 RX ORDER — PANCRELIPASE LIPASE, PANCRELIPASE PROTEASE, PANCRELIPASE AMYLASE 252600; 60000; 189600 [USP'U]/1; [USP'U]/1; [USP'U]/1
180000 CAPSULE, DELAYED RELEASE ORAL
Qty: 270 CAPSULE | Refills: 1 | Status: SHIPPED | OUTPATIENT
Start: 2025-06-30

## 2025-06-30 RX ORDER — SODIUM CHLORIDE, SODIUM LACTATE, POTASSIUM CHLORIDE, CALCIUM CHLORIDE 600; 310; 30; 20 MG/100ML; MG/100ML; MG/100ML; MG/100ML
125 INJECTION, SOLUTION INTRAVENOUS CONTINUOUS
Status: CANCELLED | OUTPATIENT
Start: 2025-06-30

## 2025-06-30 RX ORDER — SUCRALFATE ORAL 1 G/10ML
1 SUSPENSION ORAL 4 TIMES DAILY
Qty: 560 ML | Refills: 0 | Status: SHIPPED | OUTPATIENT
Start: 2025-06-30 | End: 2025-07-14
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
## 2025-06-30 NOTE — ASSESSMENT & PLAN NOTE
Patient presents for follow-up, urgent appointment for reevaluation of progressively worsening dysphagia.  She has h/o anterior cervical esophageal web, prominent cricopharyngeal bar and previous EGD with dilation for the same.  She is a patient of Dr. Benedict requesting EGD with dilation.  She reports exacerbation of reflux this week with dysphagia.  Reports feeling intermittent food bolus obstruction mid sternum with slow passage of food through the area as well.  Reports discomfort when this occurs.     She takes protonix BID and pepcid prn.  Will give carafate for prn odynophagia.  Given Asthma history and long term inhaled steroid usage, I have concern for infectious esophagitis.  I d/w patient, dilation may not be performed if there is active infection or reflux esophagitis.  She voiced understanding and  additionally accepts risk for procedure.   Orders:    sucralfate (CARAFATE) 1 g/10 mL suspension; Take 10 mL (1 g total) by mouth 4 (four) times a day for 14 days    EGD Dilation; Future

## 2025-06-30 NOTE — TELEPHONE ENCOUNTER
Procedure: EGD w/dilation  Date: 8/28/25  Physician performing: Dr. Gamble  Location of procedure:  [facility]  Instructions given to patient: EGD  Diabetic: No  Clearances: N/A

## 2025-06-30 NOTE — ASSESSMENT & PLAN NOTE
Needs refill.  No concerns at present time.  Orders:    Pancrelipase, Lip-Prot-Amyl, (Zenpep) [id number] units CPEP; Take 180,000 units of lipase by mouth 3 (three) times a day with meals 3 capsules 3 times daily with meals

## 2025-08-19 ENCOUNTER — APPOINTMENT (OUTPATIENT)
Dept: LAB | Facility: MEDICAL CENTER | Age: 60
End: 2025-08-19
Payer: COMMERCIAL

## 2025-08-19 DIAGNOSIS — Z79.60 LONG-TERM USE OF IMMUNOSUPPRESSANT MEDICATION: ICD-10-CM

## 2025-08-19 LAB
BASOPHILS # BLD AUTO: 0.06 THOUSANDS/ÂΜL (ref 0–0.1)
BASOPHILS NFR BLD AUTO: 1 % (ref 0–1)
BILIRUB DIRECT SERPL-MCNC: 0.07 MG/DL (ref 0–0.2)
EOSINOPHIL # BLD AUTO: 0.21 THOUSAND/ÂΜL (ref 0–0.61)
EOSINOPHIL NFR BLD AUTO: 3 % (ref 0–6)
ERYTHROCYTE [DISTWIDTH] IN BLOOD BY AUTOMATED COUNT: 17 % (ref 11.6–15.1)
HCT VFR BLD AUTO: 36.6 % (ref 34.8–46.1)
HGB BLD-MCNC: 11.7 G/DL (ref 11.5–15.4)
IMM GRANULOCYTES # BLD AUTO: 0.02 THOUSAND/UL (ref 0–0.2)
IMM GRANULOCYTES NFR BLD AUTO: 0 % (ref 0–2)
LYMPHOCYTES # BLD AUTO: 1.84 THOUSANDS/ÂΜL (ref 0.6–4.47)
LYMPHOCYTES NFR BLD AUTO: 25 % (ref 14–44)
MCH RBC QN AUTO: 27.7 PG (ref 26.8–34.3)
MCHC RBC AUTO-ENTMCNC: 32 G/DL (ref 31.4–37.4)
MCV RBC AUTO: 87 FL (ref 82–98)
MONOCYTES # BLD AUTO: 1.02 THOUSAND/ÂΜL (ref 0.17–1.22)
MONOCYTES NFR BLD AUTO: 14 % (ref 4–12)
NEUTROPHILS # BLD AUTO: 4.26 THOUSANDS/ÂΜL (ref 1.85–7.62)
NEUTS SEG NFR BLD AUTO: 57 % (ref 43–75)
NRBC BLD AUTO-RTO: 0 /100 WBCS
PLATELET # BLD AUTO: 461 THOUSANDS/UL (ref 149–390)
PMV BLD AUTO: 9.4 FL (ref 8.9–12.7)
RBC # BLD AUTO: 4.22 MILLION/UL (ref 3.81–5.12)
WBC # BLD AUTO: 7.41 THOUSAND/UL (ref 4.31–10.16)

## 2025-08-19 PROCEDURE — 82248 BILIRUBIN DIRECT: CPT

## 2025-08-19 PROCEDURE — 85025 COMPLETE CBC W/AUTO DIFF WBC: CPT

## 2025-08-19 PROCEDURE — 36415 COLL VENOUS BLD VENIPUNCTURE: CPT
